# Patient Record
Sex: FEMALE | Race: WHITE | NOT HISPANIC OR LATINO | Employment: OTHER | URBAN - METROPOLITAN AREA
[De-identification: names, ages, dates, MRNs, and addresses within clinical notes are randomized per-mention and may not be internally consistent; named-entity substitution may affect disease eponyms.]

---

## 2019-07-22 ENCOUNTER — CLINICAL SUPPORT NO REQUIREMENTS (OUTPATIENT)
Dept: CARDIOLOGY | Facility: CLINIC | Age: 79
End: 2019-07-22

## 2019-07-22 DIAGNOSIS — R55 SYNCOPE AND COLLAPSE: ICD-10-CM

## 2019-07-22 DIAGNOSIS — Z95.0 PACEMAKER: Primary | ICD-10-CM

## 2019-07-22 PROCEDURE — 93294 REM INTERROG EVL PM/LDLS PM: CPT | Performed by: INTERNAL MEDICINE

## 2019-07-22 PROCEDURE — 93296 REM INTERROG EVL PM/IDS: CPT | Performed by: INTERNAL MEDICINE

## 2019-07-24 PROBLEM — Z95.0 PRESENCE OF CARDIAC PACEMAKER: Status: ACTIVE | Noted: 2017-02-21

## 2019-07-24 PROBLEM — I44.1 SECOND DEGREE ATRIOVENTRICULAR BLOCK: Status: ACTIVE | Noted: 2017-02-27

## 2019-08-16 PROBLEM — F03.90 DEMENTIA (HCC): Status: ACTIVE | Noted: 2017-02-27

## 2019-08-16 PROBLEM — R55 SYNCOPE: Status: ACTIVE | Noted: 2017-02-27

## 2019-10-21 ENCOUNTER — CLINICAL SUPPORT NO REQUIREMENTS (OUTPATIENT)
Dept: CARDIOLOGY | Facility: CLINIC | Age: 79
End: 2019-10-21

## 2019-10-21 DIAGNOSIS — Z95.0 PACEMAKER: ICD-10-CM

## 2019-10-21 DIAGNOSIS — R55 SYNCOPE AND COLLAPSE: Primary | ICD-10-CM

## 2019-10-21 PROCEDURE — 93294 REM INTERROG EVL PM/LDLS PM: CPT | Performed by: INTERNAL MEDICINE

## 2019-10-21 PROCEDURE — 93296 REM INTERROG EVL PM/IDS: CPT | Performed by: INTERNAL MEDICINE

## 2019-10-28 NOTE — PROGRESS NOTES
Spoke with dtr in law advsd that device check ok when f/u check would be and she verbally understood.

## 2020-01-17 ENCOUNTER — TELEPHONE (OUTPATIENT)
Dept: CARDIOLOGY | Facility: CLINIC | Age: 80
End: 2020-01-17

## 2020-01-17 NOTE — TELEPHONE ENCOUNTER
Called patient, left message on home number to call our office, needs follow up apt (with device check).

## 2020-01-20 ENCOUNTER — CLINICAL SUPPORT NO REQUIREMENTS (OUTPATIENT)
Dept: CARDIOLOGY | Facility: CLINIC | Age: 80
End: 2020-01-20

## 2020-01-20 DIAGNOSIS — Z95.0 PACEMAKER: Primary | ICD-10-CM

## 2020-01-20 DIAGNOSIS — R00.1 BRADYCARDIA: ICD-10-CM

## 2020-01-20 PROCEDURE — 93296 REM INTERROG EVL PM/IDS: CPT | Performed by: INTERNAL MEDICINE

## 2020-01-20 PROCEDURE — 93294 REM INTERROG EVL PM/LDLS PM: CPT | Performed by: INTERNAL MEDICINE

## 2020-01-23 NOTE — TELEPHONE ENCOUNTER
SPOKE TO ANUPAMA. PATIENT IS IN NURSING FACILITY, HAS DEMENTIA, AND NOT MOBILE. ANUPAMA SAID HE CANT JUST TAKE HER OUT AND THAT SHE HAS A BOX BESIDE HER BED THAT MONITORS PACEMAKER.

## 2020-01-24 NOTE — TELEPHONE ENCOUNTER
Spoke to Jean Claude, per Dr Peña, we will continue to remote monitor and not see in office due to current condition.

## 2020-04-21 ENCOUNTER — CLINICAL SUPPORT NO REQUIREMENTS (OUTPATIENT)
Dept: CARDIOLOGY | Facility: CLINIC | Age: 80
End: 2020-04-21

## 2020-04-21 DIAGNOSIS — Z95.0 PRESENCE OF CARDIAC PACEMAKER: Primary | ICD-10-CM

## 2020-04-21 DIAGNOSIS — I44.1 SECOND DEGREE ATRIOVENTRICULAR BLOCK: ICD-10-CM

## 2020-04-21 PROCEDURE — 93294 REM INTERROG EVL PM/LDLS PM: CPT | Performed by: INTERNAL MEDICINE

## 2020-04-21 PROCEDURE — 93296 REM INTERROG EVL PM/IDS: CPT | Performed by: INTERNAL MEDICINE

## 2020-06-26 ENCOUNTER — TELEPHONE (OUTPATIENT)
Dept: CARDIOLOGY | Facility: CLINIC | Age: 80
End: 2020-06-26

## 2020-06-26 NOTE — TELEPHONE ENCOUNTER
Jagjit called from [employer] to confirm Patient is still being monitored by our office he states that he does not believe that the patient knows Dr. Ji is retired. There is a previous phone note stating that the patient is in a nursing facility and has dementia. It was discussed with the son to continue monitoring patient.     Jagjit request call back at [phone]    thanks

## 2020-07-29 ENCOUNTER — CLINICAL SUPPORT NO REQUIREMENTS (OUTPATIENT)
Dept: CARDIOLOGY | Facility: CLINIC | Age: 80
End: 2020-07-29

## 2020-07-29 DIAGNOSIS — Z95.0 PACEMAKER: Primary | ICD-10-CM

## 2020-07-29 DIAGNOSIS — R00.1 BRADYCARDIA: ICD-10-CM

## 2020-07-29 PROCEDURE — 93296 REM INTERROG EVL PM/IDS: CPT | Performed by: INTERNAL MEDICINE

## 2020-07-29 PROCEDURE — 93294 REM INTERROG EVL PM/LDLS PM: CPT | Performed by: INTERNAL MEDICINE

## 2020-11-04 RX ORDER — RISPERIDONE 1 MG/1
TABLET ORAL
Qty: 30 TABLET | Refills: 3 | OUTPATIENT
Start: 2020-11-04

## 2020-11-29 RX ORDER — RISPERIDONE 1 MG/1
TABLET, ORALLY DISINTEGRATING ORAL
Qty: 15 TABLET | Refills: 4 | OUTPATIENT
Start: 2020-11-29

## 2020-12-28 RX ORDER — RISPERIDONE 1 MG/1
TABLET, ORALLY DISINTEGRATING ORAL
Qty: 15 TABLET | Refills: 4 | OUTPATIENT
Start: 2020-12-28

## 2021-01-12 RX ORDER — RISPERIDONE 1 MG/1
TABLET, ORALLY DISINTEGRATING ORAL
Qty: 15 TABLET | Refills: 4 | OUTPATIENT
Start: 2021-01-12

## 2021-04-06 PROCEDURE — 93296 REM INTERROG EVL PM/IDS: CPT | Performed by: INTERNAL MEDICINE

## 2021-04-06 PROCEDURE — 93294 REM INTERROG EVL PM/LDLS PM: CPT | Performed by: INTERNAL MEDICINE

## 2021-04-12 RX ORDER — RISPERIDONE 1 MG/1
TABLET, ORALLY DISINTEGRATING ORAL
Qty: 15 TABLET | OUTPATIENT
Start: 2021-04-12

## 2021-05-10 RX ORDER — RISPERIDONE 1 MG/1
TABLET, ORALLY DISINTEGRATING ORAL
Qty: 15 TABLET | OUTPATIENT
Start: 2021-05-10

## 2021-05-21 RX ORDER — RISPERIDONE 1 MG/1
TABLET, ORALLY DISINTEGRATING ORAL
Qty: 15 TABLET | OUTPATIENT
Start: 2021-05-21

## 2021-07-06 PROCEDURE — 93294 REM INTERROG EVL PM/LDLS PM: CPT | Performed by: INTERNAL MEDICINE

## 2021-07-06 PROCEDURE — 93296 REM INTERROG EVL PM/IDS: CPT | Performed by: INTERNAL MEDICINE

## 2022-01-04 PROCEDURE — 93296 REM INTERROG EVL PM/IDS: CPT | Performed by: INTERNAL MEDICINE

## 2022-01-04 PROCEDURE — 93294 REM INTERROG EVL PM/LDLS PM: CPT | Performed by: INTERNAL MEDICINE

## 2022-10-04 PROCEDURE — 93294 REM INTERROG EVL PM/LDLS PM: CPT | Performed by: INTERNAL MEDICINE

## 2022-10-04 PROCEDURE — 93296 REM INTERROG EVL PM/IDS: CPT | Performed by: INTERNAL MEDICINE

## 2023-01-03 PROCEDURE — 93296 REM INTERROG EVL PM/IDS: CPT | Performed by: INTERNAL MEDICINE

## 2023-01-03 PROCEDURE — 93294 REM INTERROG EVL PM/LDLS PM: CPT | Performed by: INTERNAL MEDICINE

## 2024-08-11 ENCOUNTER — APPOINTMENT (OUTPATIENT)
Dept: GENERAL RADIOLOGY | Facility: HOSPITAL | Age: 84
DRG: 482 | End: 2024-08-11
Payer: MEDICARE

## 2024-08-11 ENCOUNTER — HOSPITAL ENCOUNTER (INPATIENT)
Facility: HOSPITAL | Age: 84
LOS: 3 days | Discharge: NURSING, REHAB OR LONG-TERM CARE | DRG: 482 | End: 2024-08-15
Attending: EMERGENCY MEDICINE | Admitting: INTERNAL MEDICINE
Payer: MEDICARE
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
## 2024-08-11 DIAGNOSIS — S72.142A DISPLACED INTERTROCHANTERIC FRACTURE OF LEFT FEMUR, INITIAL ENCOUNTER FOR CLOSED FRACTURE: Primary | ICD-10-CM

## 2024-08-11 LAB
ANION GAP SERPL CALCULATED.3IONS-SCNC: 12.1 MMOL/L (ref 5–15)
APTT PPP: 27.4 SECONDS (ref 61–76.5)
BASOPHILS # BLD AUTO: 0.04 10*3/MM3 (ref 0–0.2)
BASOPHILS NFR BLD AUTO: 0.3 % (ref 0–1.5)
BUN SERPL-MCNC: 33 MG/DL (ref 8–23)
BUN/CREAT SERPL: 34.4 (ref 7–25)
CALCIUM SPEC-SCNC: 9.3 MG/DL (ref 8.6–10.5)
CHLORIDE SERPL-SCNC: 103 MMOL/L (ref 98–107)
CO2 SERPL-SCNC: 24.9 MMOL/L (ref 22–29)
CREAT SERPL-MCNC: 0.96 MG/DL (ref 0.57–1)
DEPRECATED RDW RBC AUTO: 47 FL (ref 37–54)
EGFRCR SERPLBLD CKD-EPI 2021: 57.7 ML/MIN/1.73
EOSINOPHIL # BLD AUTO: 0 10*3/MM3 (ref 0–0.4)
EOSINOPHIL NFR BLD AUTO: 0 % (ref 0.3–6.2)
ERYTHROCYTE [DISTWIDTH] IN BLOOD BY AUTOMATED COUNT: 14 % (ref 12.3–15.4)
GLUCOSE SERPL-MCNC: 128 MG/DL (ref 65–99)
HCT VFR BLD AUTO: 41 % (ref 34–46.6)
HGB BLD-MCNC: 13.6 G/DL (ref 12–15.9)
IMM GRANULOCYTES # BLD AUTO: 0.07 10*3/MM3 (ref 0–0.05)
IMM GRANULOCYTES NFR BLD AUTO: 0.5 % (ref 0–0.5)
INR PPP: 1.05 (ref 0.93–1.1)
LYMPHOCYTES # BLD AUTO: 1.27 10*3/MM3 (ref 0.7–3.1)
LYMPHOCYTES NFR BLD AUTO: 8.7 % (ref 19.6–45.3)
MCH RBC QN AUTO: 30.5 PG (ref 26.6–33)
MCHC RBC AUTO-ENTMCNC: 33.2 G/DL (ref 31.5–35.7)
MCV RBC AUTO: 91.9 FL (ref 79–97)
MONOCYTES # BLD AUTO: 1.25 10*3/MM3 (ref 0.1–0.9)
MONOCYTES NFR BLD AUTO: 8.6 % (ref 5–12)
NEUTROPHILS NFR BLD AUTO: 11.92 10*3/MM3 (ref 1.7–7)
NEUTROPHILS NFR BLD AUTO: 81.9 % (ref 42.7–76)
NRBC BLD AUTO-RTO: 0 /100 WBC (ref 0–0.2)
PLATELET # BLD AUTO: 327 10*3/MM3 (ref 140–450)
PMV BLD AUTO: 10.1 FL (ref 6–12)
POTASSIUM SERPL-SCNC: 4.3 MMOL/L (ref 3.5–5.2)
PROTHROMBIN TIME: 11.4 SECONDS (ref 9.6–11.7)
RBC # BLD AUTO: 4.46 10*6/MM3 (ref 3.77–5.28)
SODIUM SERPL-SCNC: 140 MMOL/L (ref 136–145)
WBC NRBC COR # BLD AUTO: 14.55 10*3/MM3 (ref 3.4–10.8)

## 2024-08-11 PROCEDURE — 80048 BASIC METABOLIC PNL TOTAL CA: CPT | Performed by: EMERGENCY MEDICINE

## 2024-08-11 PROCEDURE — 73502 X-RAY EXAM HIP UNI 2-3 VIEWS: CPT

## 2024-08-11 PROCEDURE — 25010000002 MORPHINE PER 10 MG: Performed by: EMERGENCY MEDICINE

## 2024-08-11 PROCEDURE — 85610 PROTHROMBIN TIME: CPT | Performed by: EMERGENCY MEDICINE

## 2024-08-11 PROCEDURE — 85730 THROMBOPLASTIN TIME PARTIAL: CPT | Performed by: EMERGENCY MEDICINE

## 2024-08-11 PROCEDURE — 99285 EMERGENCY DEPT VISIT HI MDM: CPT

## 2024-08-11 PROCEDURE — 25010000002 ONDANSETRON PER 1 MG: Performed by: EMERGENCY MEDICINE

## 2024-08-11 PROCEDURE — 85025 COMPLETE CBC W/AUTO DIFF WBC: CPT | Performed by: EMERGENCY MEDICINE

## 2024-08-11 PROCEDURE — 71045 X-RAY EXAM CHEST 1 VIEW: CPT

## 2024-08-11 RX ORDER — ACETAMINOPHEN 325 MG/1
650 TABLET ORAL EVERY 6 HOURS PRN
COMMUNITY

## 2024-08-11 RX ORDER — SENNOSIDES A AND B 8.6 MG/1
1 TABLET, FILM COATED ORAL EVERY 12 HOURS PRN
COMMUNITY

## 2024-08-11 RX ORDER — MORPHINE SULFATE 2 MG/ML
2 INJECTION, SOLUTION INTRAMUSCULAR; INTRAVENOUS ONCE
Status: COMPLETED | OUTPATIENT
Start: 2024-08-11 | End: 2024-08-11

## 2024-08-11 RX ORDER — MORPHINE SULFATE 20 MG/ML
10 SOLUTION ORAL EVERY 4 HOURS PRN
COMMUNITY

## 2024-08-11 RX ORDER — ONDANSETRON 2 MG/ML
4 INJECTION INTRAMUSCULAR; INTRAVENOUS ONCE
Status: COMPLETED | OUTPATIENT
Start: 2024-08-11 | End: 2024-08-11

## 2024-08-11 RX ORDER — HYOSCYAMINE SULFATE 0.125 MG
0.12 TABLET ORAL EVERY 4 HOURS PRN
COMMUNITY

## 2024-08-11 RX ORDER — POLYETHYLENE GLYCOL 3350 17 G/17G
17 POWDER, FOR SOLUTION ORAL DAILY PRN
COMMUNITY

## 2024-08-11 RX ORDER — BISACODYL 10 MG
10 SUPPOSITORY, RECTAL RECTAL SEE ADMIN INSTRUCTIONS
COMMUNITY

## 2024-08-11 RX ORDER — ROPINIROLE 0.25 MG/1
0.25 TABLET, FILM COATED ORAL NIGHTLY
COMMUNITY

## 2024-08-11 RX ORDER — ESCITALOPRAM OXALATE 10 MG/1
10 TABLET ORAL DAILY
COMMUNITY

## 2024-08-11 RX ADMIN — ONDANSETRON 4 MG: 2 INJECTION INTRAMUSCULAR; INTRAVENOUS at 19:10

## 2024-08-11 RX ADMIN — MORPHINE SULFATE 2 MG: 2 INJECTION, SOLUTION INTRAMUSCULAR; INTRAVENOUS at 19:11

## 2024-08-11 NOTE — Clinical Note
Level of Care: Med/Surg [1]   Admitting Physician: JOHN MCFADDEN [[id number]]   Attending Physician: JOHN MCFADDEN [[id number]]

## 2024-08-11 NOTE — LETTER
EMS Transport Request  For use at [facility], [place], [place], [facility], and [place] only   Patient Name: Viri Hatfield : 1940   Weight:56.3 kg (124 lb 1.9 oz) Pick-up Location: [facility] BLS/ALS: BLS/ALS: BLS   Insurance: MEDICARE Auth End Date: 08/15/2024   Pre-Cert #: D/C Summary complete:    Destination: [address] [place] [address]   Contact Precautions: None   Equipment (O2, Fluids, etc.): O2, settings 2L NC   Arrive By Date/Time: 08/15/2024  1145 Stretcher/WC: Stretcher   CM Requesting: Nilsa Agee RN Ext: [phone]   Notes/Medical Necessity: UNABLE TO AMBULATE, CONFUSED, POOR TRUNK CONTROL 02 2L      ______________________________________________________________________    *Only 2 patient bags OR 1 carry-on size bag are permitted.  Wheelchairs and walkers CANNOT transported with the patient. Acknowledge: Yes

## 2024-08-11 NOTE — ED PROVIDER NOTES
"Subjective   History of Present Illness  Chief complaint: Left hip injury    84-year-old female presents with a left hip injury after a fall.  EMS reports patient slipped out of her wheelchair last night and has been unable to bear weight since that time.  The nursing home had an x-ray performed today which showed a left hip fracture.  Patient has dementia and cannot provide any additional details.  She does not take any blood thinners.    History provided by:  EMS personnel      Review of Systems   Unable to perform ROS: Dementia       Past Medical History:   Diagnosis Date    Dementia     Pacemaker 02/21/2017    AV Eduard Gleason - Dr. Zuñiga     Syncope     x3 Jan. and Feb. 2017       No Known Allergies    Past Surgical History:   Procedure Laterality Date    BREAST LUMPECTOMY Left     HERNIA REPAIR         Family History   Problem Relation Age of Onset    Cancer Mother        Social History     Socioeconomic History    Marital status:    Tobacco Use    Smoking status: Never       /75   Pulse 84   Temp 98.5 °F (36.9 °C) (Oral)   Resp 14   Ht 157.5 cm (62\")   Wt 54.4 kg (119 lb 14.9 oz)   SpO2 96%   BMI 21.94 kg/m²       Objective   Physical Exam  Vitals and nursing note reviewed.   Constitutional:       Appearance: Normal appearance.   HENT:      Head: Normocephalic and atraumatic.      Mouth/Throat:      Mouth: Mucous membranes are moist.   Cardiovascular:      Rate and Rhythm: Normal rate and regular rhythm.      Heart sounds: Normal heart sounds.   Pulmonary:      Effort: Pulmonary effort is normal. No respiratory distress.      Breath sounds: Normal breath sounds.   Abdominal:      Palpations: Abdomen is soft.      Tenderness: There is no abdominal tenderness.   Musculoskeletal:      Comments: Obvious deformity to the left hip with shortening of the left leg.  Neurovascular intact distally.   Skin:     General: Skin is warm and dry.   Neurological:      Mental Status: She is alert. "      Comments: Oriented to person only.  This is baseline according to EMS.  No obvious focal motor or sensory deficit appreciated.         Procedures           ED Course      Results for orders placed or performed during the hospital encounter of 08/11/24   Basic Metabolic Panel    Specimen: Blood   Result Value Ref Range    Glucose 128 (H) 65 - 99 mg/dL    BUN 33 (H) 8 - 23 mg/dL    Creatinine 0.96 0.57 - 1.00 mg/dL    Sodium 140 136 - 145 mmol/L    Potassium 4.3 3.5 - 5.2 mmol/L    Chloride 103 98 - 107 mmol/L    CO2 24.9 22.0 - 29.0 mmol/L    Calcium 9.3 8.6 - 10.5 mg/dL    BUN/Creatinine Ratio 34.4 (H) 7.0 - 25.0    Anion Gap 12.1 5.0 - 15.0 mmol/L    eGFR 57.7 (L) >60.0 mL/min/1.73   Protime-INR    Specimen: Blood   Result Value Ref Range    Protime 11.4 9.6 - 11.7 Seconds    INR 1.05 0.93 - 1.10   aPTT    Specimen: Blood   Result Value Ref Range    PTT 27.4 (L) 61.0 - 76.5 seconds   CBC Auto Differential    Specimen: Blood   Result Value Ref Range    WBC 14.55 (H) 3.40 - 10.80 10*3/mm3    RBC 4.46 3.77 - 5.28 10*6/mm3    Hemoglobin 13.6 12.0 - 15.9 g/dL    Hematocrit 41.0 34.0 - 46.6 %    MCV 91.9 79.0 - 97.0 fL    MCH 30.5 26.6 - 33.0 pg    MCHC 33.2 31.5 - 35.7 g/dL    RDW 14.0 12.3 - 15.4 %    RDW-SD 47.0 37.0 - 54.0 fl    MPV 10.1 6.0 - 12.0 fL    Platelets 327 140 - 450 10*3/mm3    Neutrophil % 81.9 (H) 42.7 - 76.0 %    Lymphocyte % 8.7 (L) 19.6 - 45.3 %    Monocyte % 8.6 5.0 - 12.0 %    Eosinophil % 0.0 (L) 0.3 - 6.2 %    Basophil % 0.3 0.0 - 1.5 %    Immature Grans % 0.5 0.0 - 0.5 %    Neutrophils, Absolute 11.92 (H) 1.70 - 7.00 10*3/mm3    Lymphocytes, Absolute 1.27 0.70 - 3.10 10*3/mm3    Monocytes, Absolute 1.25 (H) 0.10 - 0.90 10*3/mm3    Eosinophils, Absolute 0.00 0.00 - 0.40 10*3/mm3    Basophils, Absolute 0.04 0.00 - 0.20 10*3/mm3    Immature Grans, Absolute 0.07 (H) 0.00 - 0.05 10*3/mm3    nRBC 0.0 0.0 - 0.2 /100 WBC     XR Chest 1 View    Result Date: 8/11/2024  Impression: No definite acute  abnormality. If symptoms persist, please consider follow-up with dedicated CT of the chest. Electronically Signed: Faustino Payton,   8/11/2024 6:45 PM EDT  Workstation ID: [id number]    XR Hip With or Without Pelvis 2 - 3 View Left    Result Date: 8/11/2024  Impression: Angulated, impacted, mildly displaced and possibly comminuted fracture through the left intertrochanteric region. Electronically Signed: Faustino Payton,   8/11/2024 6:44 PM EDT  Workstation ID: [id number]                                          Medical Decision Making  Amount and/or Complexity of Data Reviewed  Labs: ordered.  Radiology: ordered.    Risk  Prescription drug management.      Patient had the above valuation.  Results were discussed with the patient.  My interpretation of chest x-ray shows no infiltrate or effusion.  Left hip x-ray is showing a left intertrochanteric femur fracture.  White blood cell count is elevated at 14.55.  No evidence of infection.  BMP is unremarkable.  I discussed with the on-call primary doctor and patient will be admitted for further evaluation and management.  I also discussed with the on-call orthopedist who will consult on the patient.      Final diagnoses:   Displaced intertrochanteric fracture of left femur, initial encounter for closed fracture   Fall, initial encounter       ED Disposition  ED Disposition       ED Disposition   Decision to Admit    Condition   --    Comment   Level of Care: Med/Surg [1]   Admitting Physician: JOHN MCFADDEN [[id number]]   Attending Physician: JOHN MCFADDEN [[id number]]                 No follow-up provider specified.       Medication List      No changes were made to your prescriptions during this visit.            Jose Loco MD  08/11/24 1952

## 2024-08-12 ENCOUNTER — APPOINTMENT (OUTPATIENT)
Dept: GENERAL RADIOLOGY | Facility: HOSPITAL | Age: 84
DRG: 482 | End: 2024-08-12
Payer: MEDICARE

## 2024-08-12 ENCOUNTER — ANESTHESIA EVENT (OUTPATIENT)
Dept: PERIOP | Facility: HOSPITAL | Age: 84
End: 2024-08-12
Payer: MEDICARE

## 2024-08-12 ENCOUNTER — ANESTHESIA (OUTPATIENT)
Dept: PERIOP | Facility: HOSPITAL | Age: 84
End: 2024-08-12
Payer: MEDICARE

## 2024-08-12 PROBLEM — S72.142A CLOSED DISPLACED INTERTROCHANTERIC FRACTURE OF LEFT FEMUR: Status: ACTIVE | Noted: 2024-08-12

## 2024-08-12 PROBLEM — S72.142A DISPLACED INTERTROCHANTERIC FRACTURE OF LEFT FEMUR, INITIAL ENCOUNTER FOR CLOSED FRACTURE: Status: ACTIVE | Noted: 2024-08-11

## 2024-08-12 LAB
ABO GROUP BLD: NORMAL
ANION GAP SERPL CALCULATED.3IONS-SCNC: 7.7 MMOL/L (ref 5–15)
ANION GAP SERPL CALCULATED.3IONS-SCNC: 9 MMOL/L (ref 5–15)
BASOPHILS # BLD AUTO: 0.02 10*3/MM3 (ref 0–0.2)
BASOPHILS # BLD AUTO: 0.05 10*3/MM3 (ref 0–0.2)
BASOPHILS NFR BLD AUTO: 0.1 % (ref 0–1.5)
BASOPHILS NFR BLD AUTO: 0.4 % (ref 0–1.5)
BLD GP AB SCN SERPL QL: NEGATIVE
BUN SERPL-MCNC: 34 MG/DL (ref 8–23)
BUN SERPL-MCNC: 39 MG/DL (ref 8–23)
BUN/CREAT SERPL: 33.7 (ref 7–25)
BUN/CREAT SERPL: 34.2 (ref 7–25)
CALCIUM SPEC-SCNC: 8.2 MG/DL (ref 8.6–10.5)
CALCIUM SPEC-SCNC: 9.3 MG/DL (ref 8.6–10.5)
CHLORIDE SERPL-SCNC: 108 MMOL/L (ref 98–107)
CHLORIDE SERPL-SCNC: 108 MMOL/L (ref 98–107)
CO2 SERPL-SCNC: 25 MMOL/L (ref 22–29)
CO2 SERPL-SCNC: 27.3 MMOL/L (ref 22–29)
CREAT SERPL-MCNC: 1.01 MG/DL (ref 0.57–1)
CREAT SERPL-MCNC: 1.14 MG/DL (ref 0.57–1)
DEPRECATED RDW RBC AUTO: 48.3 FL (ref 37–54)
DEPRECATED RDW RBC AUTO: 48.5 FL (ref 37–54)
EGFRCR SERPLBLD CKD-EPI 2021: 47 ML/MIN/1.73
EGFRCR SERPLBLD CKD-EPI 2021: 54.3 ML/MIN/1.73
EOSINOPHIL # BLD AUTO: 0.01 10*3/MM3 (ref 0–0.4)
EOSINOPHIL # BLD AUTO: 0.03 10*3/MM3 (ref 0–0.4)
EOSINOPHIL NFR BLD AUTO: 0.1 % (ref 0.3–6.2)
EOSINOPHIL NFR BLD AUTO: 0.2 % (ref 0.3–6.2)
ERYTHROCYTE [DISTWIDTH] IN BLOOD BY AUTOMATED COUNT: 14 % (ref 12.3–15.4)
ERYTHROCYTE [DISTWIDTH] IN BLOOD BY AUTOMATED COUNT: 14.3 % (ref 12.3–15.4)
GLUCOSE SERPL-MCNC: 110 MG/DL (ref 65–99)
GLUCOSE SERPL-MCNC: 207 MG/DL (ref 65–99)
HCT VFR BLD AUTO: 36.7 % (ref 34–46.6)
HCT VFR BLD AUTO: 38.1 % (ref 34–46.6)
HGB BLD-MCNC: 12 G/DL (ref 12–15.9)
HGB BLD-MCNC: 12.2 G/DL (ref 12–15.9)
IMM GRANULOCYTES # BLD AUTO: 0.06 10*3/MM3 (ref 0–0.05)
IMM GRANULOCYTES # BLD AUTO: 0.1 10*3/MM3 (ref 0–0.05)
IMM GRANULOCYTES NFR BLD AUTO: 0.5 % (ref 0–0.5)
IMM GRANULOCYTES NFR BLD AUTO: 0.7 % (ref 0–0.5)
LYMPHOCYTES # BLD AUTO: 0.64 10*3/MM3 (ref 0.7–3.1)
LYMPHOCYTES # BLD AUTO: 1.13 10*3/MM3 (ref 0.7–3.1)
LYMPHOCYTES NFR BLD AUTO: 4.4 % (ref 19.6–45.3)
LYMPHOCYTES NFR BLD AUTO: 8.6 % (ref 19.6–45.3)
MCH RBC QN AUTO: 30 PG (ref 26.6–33)
MCH RBC QN AUTO: 30.7 PG (ref 26.6–33)
MCHC RBC AUTO-ENTMCNC: 32 G/DL (ref 31.5–35.7)
MCHC RBC AUTO-ENTMCNC: 32.7 G/DL (ref 31.5–35.7)
MCV RBC AUTO: 93.8 FL (ref 79–97)
MCV RBC AUTO: 93.9 FL (ref 79–97)
MONOCYTES # BLD AUTO: 1.18 10*3/MM3 (ref 0.1–0.9)
MONOCYTES # BLD AUTO: 1.31 10*3/MM3 (ref 0.1–0.9)
MONOCYTES NFR BLD AUTO: 8.9 % (ref 5–12)
MONOCYTES NFR BLD AUTO: 8.9 % (ref 5–12)
NEUTROPHILS NFR BLD AUTO: 10.75 10*3/MM3 (ref 1.7–7)
NEUTROPHILS NFR BLD AUTO: 12.6 10*3/MM3 (ref 1.7–7)
NEUTROPHILS NFR BLD AUTO: 81.4 % (ref 42.7–76)
NEUTROPHILS NFR BLD AUTO: 85.8 % (ref 42.7–76)
NRBC BLD AUTO-RTO: 0 /100 WBC (ref 0–0.2)
NRBC BLD AUTO-RTO: 0 /100 WBC (ref 0–0.2)
PLATELET # BLD AUTO: 268 10*3/MM3 (ref 140–450)
PLATELET # BLD AUTO: 286 10*3/MM3 (ref 140–450)
PMV BLD AUTO: 10.1 FL (ref 6–12)
PMV BLD AUTO: 9.8 FL (ref 6–12)
POTASSIUM SERPL-SCNC: 4.6 MMOL/L (ref 3.5–5.2)
POTASSIUM SERPL-SCNC: 4.7 MMOL/L (ref 3.5–5.2)
RBC # BLD AUTO: 3.91 10*6/MM3 (ref 3.77–5.28)
RBC # BLD AUTO: 4.06 10*6/MM3 (ref 3.77–5.28)
RH BLD: POSITIVE
SODIUM SERPL-SCNC: 142 MMOL/L (ref 136–145)
SODIUM SERPL-SCNC: 143 MMOL/L (ref 136–145)
T&S EXPIRATION DATE: NORMAL
WBC NRBC COR # BLD AUTO: 13.2 10*3/MM3 (ref 3.4–10.8)
WBC NRBC COR # BLD AUTO: 14.68 10*3/MM3 (ref 3.4–10.8)

## 2024-08-12 PROCEDURE — 25010000002 SUGAMMADEX 200 MG/2ML SOLUTION: Performed by: NURSE ANESTHETIST, CERTIFIED REGISTERED

## 2024-08-12 PROCEDURE — 36415 COLL VENOUS BLD VENIPUNCTURE: CPT

## 2024-08-12 PROCEDURE — 80048 BASIC METABOLIC PNL TOTAL CA: CPT

## 2024-08-12 PROCEDURE — 86900 BLOOD TYPING SEROLOGIC ABO: CPT | Performed by: ORTHOPAEDIC SURGERY

## 2024-08-12 PROCEDURE — 86901 BLOOD TYPING SEROLOGIC RH(D): CPT

## 2024-08-12 PROCEDURE — C1713 ANCHOR/SCREW BN/BN,TIS/BN: HCPCS | Performed by: ORTHOPAEDIC SURGERY

## 2024-08-12 PROCEDURE — 86901 BLOOD TYPING SEROLOGIC RH(D): CPT | Performed by: ORTHOPAEDIC SURGERY

## 2024-08-12 PROCEDURE — 25010000002 BUPIVACAINE (PF) 0.5 % SOLUTION: Performed by: ORTHOPAEDIC SURGERY

## 2024-08-12 PROCEDURE — 25010000002 PHENYLEPHRINE 10 MG/ML SOLUTION 5 ML VIAL: Performed by: NURSE ANESTHETIST, CERTIFIED REGISTERED

## 2024-08-12 PROCEDURE — 25010000002 FENTANYL CITRATE (PF) 50 MCG/ML SOLUTION: Performed by: NURSE ANESTHETIST, CERTIFIED REGISTERED

## 2024-08-12 PROCEDURE — 80048 BASIC METABOLIC PNL TOTAL CA: CPT | Performed by: ORTHOPAEDIC SURGERY

## 2024-08-12 PROCEDURE — C1769 GUIDE WIRE: HCPCS | Performed by: ORTHOPAEDIC SURGERY

## 2024-08-12 PROCEDURE — 0QS736Z REPOSITION LEFT UPPER FEMUR WITH INTRAMEDULLARY INTERNAL FIXATION DEVICE, PERCUTANEOUS APPROACH: ICD-10-PCS | Performed by: ORTHOPAEDIC SURGERY

## 2024-08-12 PROCEDURE — 86900 BLOOD TYPING SEROLOGIC ABO: CPT

## 2024-08-12 PROCEDURE — 25010000002 PROPOFOL 200 MG/20ML EMULSION: Performed by: NURSE ANESTHETIST, CERTIFIED REGISTERED

## 2024-08-12 PROCEDURE — 85025 COMPLETE CBC W/AUTO DIFF WBC: CPT | Performed by: ORTHOPAEDIC SURGERY

## 2024-08-12 PROCEDURE — 76000 FLUOROSCOPY <1 HR PHYS/QHP: CPT

## 2024-08-12 PROCEDURE — 25810000003 SODIUM CHLORIDE 0.9 % SOLUTION 250 ML FLEX CONT: Performed by: NURSE ANESTHETIST, CERTIFIED REGISTERED

## 2024-08-12 PROCEDURE — 86850 RBC ANTIBODY SCREEN: CPT | Performed by: ORTHOPAEDIC SURGERY

## 2024-08-12 PROCEDURE — 25010000002 DEXAMETHASONE PER 1 MG: Performed by: NURSE ANESTHETIST, CERTIFIED REGISTERED

## 2024-08-12 PROCEDURE — 25010000002 ONDANSETRON PER 1 MG: Performed by: NURSE ANESTHETIST, CERTIFIED REGISTERED

## 2024-08-12 PROCEDURE — 25010000002 CEFAZOLIN PER 500 MG: Performed by: ORTHOPAEDIC SURGERY

## 2024-08-12 PROCEDURE — 25010000002 FENTANYL CITRATE (PF) 100 MCG/2ML SOLUTION: Performed by: NURSE ANESTHETIST, CERTIFIED REGISTERED

## 2024-08-12 PROCEDURE — 25810000003 LACTATED RINGERS PER 1000 ML: Performed by: NURSE ANESTHETIST, CERTIFIED REGISTERED

## 2024-08-12 PROCEDURE — 73502 X-RAY EXAM HIP UNI 2-3 VIEWS: CPT

## 2024-08-12 PROCEDURE — 25810000003 LACTATED RINGERS PER 1000 ML: Performed by: ORTHOPAEDIC SURGERY

## 2024-08-12 PROCEDURE — 85025 COMPLETE CBC W/AUTO DIFF WBC: CPT

## 2024-08-12 DEVICE — ZNN CMN LAG SCREW 10.5X95
Type: IMPLANTABLE DEVICE | Site: HIP | Status: FUNCTIONAL
Brand: ZIMMER® NATURAL NAIL® SYSTEM

## 2024-08-12 DEVICE — ZNN CMN NAIL 10MMX21.5CM 125L
Type: IMPLANTABLE DEVICE | Site: HIP | Status: FUNCTIONAL
Brand: ZIMMER® NATURAL NAIL® SYSTEM

## 2024-08-12 DEVICE — SCRW CORT FA FUL/THRD HEX3.5 TI 5X30MM: Type: IMPLANTABLE DEVICE | Site: HIP | Status: FUNCTIONAL

## 2024-08-12 RX ORDER — FENTANYL CITRATE 50 UG/ML
25 INJECTION, SOLUTION INTRAMUSCULAR; INTRAVENOUS
Status: DISCONTINUED | OUTPATIENT
Start: 2024-08-12 | End: 2024-08-12 | Stop reason: HOSPADM

## 2024-08-12 RX ORDER — FENTANYL CITRATE 50 UG/ML
INJECTION, SOLUTION INTRAMUSCULAR; INTRAVENOUS AS NEEDED
Status: DISCONTINUED | OUTPATIENT
Start: 2024-08-12 | End: 2024-08-12 | Stop reason: SURG

## 2024-08-12 RX ORDER — DIPHENHYDRAMINE HYDROCHLORIDE 50 MG/ML
12.5 INJECTION INTRAMUSCULAR; INTRAVENOUS
Status: DISCONTINUED | OUTPATIENT
Start: 2024-08-12 | End: 2024-08-12 | Stop reason: HOSPADM

## 2024-08-12 RX ORDER — AMOXICILLIN 250 MG
2 CAPSULE ORAL 2 TIMES DAILY PRN
Status: DISCONTINUED | OUTPATIENT
Start: 2024-08-12 | End: 2024-08-15 | Stop reason: HOSPADM

## 2024-08-12 RX ORDER — ACETAMINOPHEN 325 MG/1
650 TABLET ORAL ONCE AS NEEDED
Status: DISCONTINUED | OUTPATIENT
Start: 2024-08-12 | End: 2024-08-12 | Stop reason: HOSPADM

## 2024-08-12 RX ORDER — ROPINIROLE 0.25 MG/1
0.25 TABLET, FILM COATED ORAL NIGHTLY
Status: DISCONTINUED | OUTPATIENT
Start: 2024-08-12 | End: 2024-08-15 | Stop reason: HOSPADM

## 2024-08-12 RX ORDER — ONDANSETRON 2 MG/ML
INJECTION INTRAMUSCULAR; INTRAVENOUS AS NEEDED
Status: DISCONTINUED | OUTPATIENT
Start: 2024-08-12 | End: 2024-08-12 | Stop reason: SURG

## 2024-08-12 RX ORDER — ALBUTEROL SULFATE 2.5 MG/3ML
2.5 SOLUTION RESPIRATORY (INHALATION) ONCE AS NEEDED
Status: DISCONTINUED | OUTPATIENT
Start: 2024-08-12 | End: 2024-08-12 | Stop reason: HOSPADM

## 2024-08-12 RX ORDER — BISACODYL 10 MG
10 SUPPOSITORY, RECTAL RECTAL SEE ADMIN INSTRUCTIONS
Status: DISCONTINUED | OUTPATIENT
Start: 2024-08-12 | End: 2024-08-15 | Stop reason: HOSPADM

## 2024-08-12 RX ORDER — POLYETHYLENE GLYCOL 3350 17 G/17G
17 POWDER, FOR SOLUTION ORAL DAILY
Status: DISCONTINUED | OUTPATIENT
Start: 2024-08-13 | End: 2024-08-15 | Stop reason: HOSPADM

## 2024-08-12 RX ORDER — NALOXONE HCL 0.4 MG/ML
0.4 VIAL (ML) INJECTION AS NEEDED
Status: DISCONTINUED | OUTPATIENT
Start: 2024-08-12 | End: 2024-08-12 | Stop reason: HOSPADM

## 2024-08-12 RX ORDER — SODIUM CHLORIDE, SODIUM LACTATE, POTASSIUM CHLORIDE, CALCIUM CHLORIDE 600; 310; 30; 20 MG/100ML; MG/100ML; MG/100ML; MG/100ML
20 INJECTION, SOLUTION INTRAVENOUS ONCE
Status: COMPLETED | OUTPATIENT
Start: 2024-08-12 | End: 2024-08-12

## 2024-08-12 RX ORDER — SENNOSIDES A AND B 8.6 MG/1
1 TABLET, FILM COATED ORAL EVERY 12 HOURS PRN
Status: DISCONTINUED | OUTPATIENT
Start: 2024-08-12 | End: 2024-08-12 | Stop reason: SDUPTHER

## 2024-08-12 RX ORDER — PROPOFOL 10 MG/ML
INJECTION, EMULSION INTRAVENOUS AS NEEDED
Status: DISCONTINUED | OUTPATIENT
Start: 2024-08-12 | End: 2024-08-12 | Stop reason: SURG

## 2024-08-12 RX ORDER — ASPIRIN 81 MG/1
81 TABLET, CHEWABLE ORAL 2 TIMES DAILY
Status: DISCONTINUED | OUTPATIENT
Start: 2024-08-13 | End: 2024-08-15 | Stop reason: HOSPADM

## 2024-08-12 RX ORDER — HYDROCODONE BITARTRATE AND ACETAMINOPHEN 5; 325 MG/1; MG/1
1 TABLET ORAL EVERY 8 HOURS PRN
Status: DISCONTINUED | OUTPATIENT
Start: 2024-08-12 | End: 2024-08-15 | Stop reason: HOSPADM

## 2024-08-12 RX ORDER — SODIUM CHLORIDE 0.9 % (FLUSH) 0.9 %
10 SYRINGE (ML) INJECTION AS NEEDED
Status: DISCONTINUED | OUTPATIENT
Start: 2024-08-12 | End: 2024-08-15 | Stop reason: HOSPADM

## 2024-08-12 RX ORDER — ONDANSETRON 2 MG/ML
4 INJECTION INTRAMUSCULAR; INTRAVENOUS EVERY 6 HOURS PRN
Status: DISCONTINUED | OUTPATIENT
Start: 2024-08-12 | End: 2024-08-15 | Stop reason: HOSPADM

## 2024-08-12 RX ORDER — ROCURONIUM BROMIDE 10 MG/ML
INJECTION, SOLUTION INTRAVENOUS AS NEEDED
Status: DISCONTINUED | OUTPATIENT
Start: 2024-08-12 | End: 2024-08-12 | Stop reason: SURG

## 2024-08-12 RX ORDER — DEXAMETHASONE SODIUM PHOSPHATE 4 MG/ML
INJECTION, SOLUTION INTRA-ARTICULAR; INTRALESIONAL; INTRAMUSCULAR; INTRAVENOUS; SOFT TISSUE AS NEEDED
Status: DISCONTINUED | OUTPATIENT
Start: 2024-08-12 | End: 2024-08-12 | Stop reason: SURG

## 2024-08-12 RX ORDER — SODIUM CHLORIDE 0.9 % (FLUSH) 0.9 %
10 SYRINGE (ML) INJECTION EVERY 12 HOURS SCHEDULED
Status: DISCONTINUED | OUTPATIENT
Start: 2024-08-12 | End: 2024-08-15 | Stop reason: HOSPADM

## 2024-08-12 RX ORDER — SODIUM CHLORIDE, SODIUM LACTATE, POTASSIUM CHLORIDE, CALCIUM CHLORIDE 600; 310; 30; 20 MG/100ML; MG/100ML; MG/100ML; MG/100ML
INJECTION, SOLUTION INTRAVENOUS CONTINUOUS PRN
Status: DISCONTINUED | OUTPATIENT
Start: 2024-08-12 | End: 2024-08-12 | Stop reason: SURG

## 2024-08-12 RX ORDER — ACETAMINOPHEN 650 MG/1
650 SUPPOSITORY RECTAL EVERY 4 HOURS PRN
Status: DISCONTINUED | OUTPATIENT
Start: 2024-08-12 | End: 2024-08-12 | Stop reason: HOSPADM

## 2024-08-12 RX ORDER — FAMOTIDINE 20 MG/1
20 TABLET, FILM COATED ORAL DAILY
Status: DISCONTINUED | OUTPATIENT
Start: 2024-08-12 | End: 2024-08-15 | Stop reason: HOSPADM

## 2024-08-12 RX ORDER — ONDANSETRON 2 MG/ML
4 INJECTION INTRAMUSCULAR; INTRAVENOUS ONCE AS NEEDED
Status: DISCONTINUED | OUTPATIENT
Start: 2024-08-12 | End: 2024-08-12 | Stop reason: HOSPADM

## 2024-08-12 RX ORDER — HYDRALAZINE HYDROCHLORIDE 20 MG/ML
5 INJECTION INTRAMUSCULAR; INTRAVENOUS
Status: DISCONTINUED | OUTPATIENT
Start: 2024-08-12 | End: 2024-08-12 | Stop reason: HOSPADM

## 2024-08-12 RX ORDER — BISACODYL 5 MG/1
5 TABLET, DELAYED RELEASE ORAL DAILY PRN
Status: DISCONTINUED | OUTPATIENT
Start: 2024-08-12 | End: 2024-08-15 | Stop reason: HOSPADM

## 2024-08-12 RX ORDER — BISACODYL 10 MG
10 SUPPOSITORY, RECTAL RECTAL DAILY PRN
Status: DISCONTINUED | OUTPATIENT
Start: 2024-08-12 | End: 2024-08-15 | Stop reason: HOSPADM

## 2024-08-12 RX ORDER — ESCITALOPRAM OXALATE 10 MG/1
10 TABLET ORAL DAILY
Status: DISCONTINUED | OUTPATIENT
Start: 2024-08-12 | End: 2024-08-15 | Stop reason: HOSPADM

## 2024-08-12 RX ORDER — SODIUM CHLORIDE 9 MG/ML
40 INJECTION, SOLUTION INTRAVENOUS AS NEEDED
Status: DISCONTINUED | OUTPATIENT
Start: 2024-08-12 | End: 2024-08-15 | Stop reason: HOSPADM

## 2024-08-12 RX ORDER — ACETAMINOPHEN 325 MG/1
650 TABLET ORAL EVERY 6 HOURS PRN
Status: DISCONTINUED | OUTPATIENT
Start: 2024-08-12 | End: 2024-08-15 | Stop reason: HOSPADM

## 2024-08-12 RX ORDER — LABETALOL HYDROCHLORIDE 5 MG/ML
5 INJECTION, SOLUTION INTRAVENOUS
Status: DISCONTINUED | OUTPATIENT
Start: 2024-08-12 | End: 2024-08-12 | Stop reason: HOSPADM

## 2024-08-12 RX ORDER — POLYETHYLENE GLYCOL 3350 17 G/17G
17 POWDER, FOR SOLUTION ORAL DAILY PRN
Status: DISCONTINUED | OUTPATIENT
Start: 2024-08-12 | End: 2024-08-15 | Stop reason: HOSPADM

## 2024-08-12 RX ORDER — BUPIVACAINE HYDROCHLORIDE 5 MG/ML
INJECTION, SOLUTION EPIDURAL; INTRACAUDAL AS NEEDED
Status: DISCONTINUED | OUTPATIENT
Start: 2024-08-12 | End: 2024-08-12 | Stop reason: HOSPADM

## 2024-08-12 RX ADMIN — LIDOCAINE HYDROCHLORIDE 50 MG: 20 INJECTION, SOLUTION EPIDURAL; INFILTRATION; INTRACAUDAL; PERINEURAL at 15:11

## 2024-08-12 RX ADMIN — Medication 10 ML: at 21:40

## 2024-08-12 RX ADMIN — PHENYLEPHRINE HYDROCHLORIDE 0.5 MCG/KG/MIN: 10 INJECTION INTRAVENOUS at 15:13

## 2024-08-12 RX ADMIN — FENTANYL CITRATE 25 MCG: 50 INJECTION, SOLUTION INTRAMUSCULAR; INTRAVENOUS at 15:45

## 2024-08-12 RX ADMIN — SODIUM CHLORIDE, SODIUM LACTATE, POTASSIUM CHLORIDE, AND CALCIUM CHLORIDE: .6; .31; .03; .02 INJECTION, SOLUTION INTRAVENOUS at 15:09

## 2024-08-12 RX ADMIN — SODIUM CHLORIDE, POTASSIUM CHLORIDE, SODIUM LACTATE AND CALCIUM CHLORIDE 20 ML/HR: 600; 310; 30; 20 INJECTION, SOLUTION INTRAVENOUS at 14:40

## 2024-08-12 RX ADMIN — ONDANSETRON 4 MG: 2 INJECTION INTRAMUSCULAR; INTRAVENOUS at 15:50

## 2024-08-12 RX ADMIN — SODIUM CHLORIDE 2000 MG: 900 INJECTION INTRAVENOUS at 15:06

## 2024-08-12 RX ADMIN — ESCITALOPRAM OXALATE 10 MG: 10 TABLET ORAL at 21:38

## 2024-08-12 RX ADMIN — PROPOFOL 20 MG: 10 INJECTION, EMULSION INTRAVENOUS at 15:57

## 2024-08-12 RX ADMIN — FENTANYL CITRATE 25 MCG: 50 INJECTION, SOLUTION INTRAMUSCULAR; INTRAVENOUS at 15:25

## 2024-08-12 RX ADMIN — ROCURONIUM BROMIDE 50 MG: 10 INJECTION, SOLUTION INTRAVENOUS at 15:11

## 2024-08-12 RX ADMIN — DEXAMETHASONE SODIUM PHOSPHATE 4 MG: 4 INJECTION, SOLUTION INTRAMUSCULAR; INTRAVENOUS at 15:26

## 2024-08-12 RX ADMIN — Medication 10 ML: at 11:20

## 2024-08-12 RX ADMIN — FENTANYL CITRATE 25 MCG: 50 INJECTION, SOLUTION INTRAMUSCULAR; INTRAVENOUS at 17:16

## 2024-08-12 RX ADMIN — SUGAMMADEX 200 MG: 100 INJECTION, SOLUTION INTRAVENOUS at 15:50

## 2024-08-12 RX ADMIN — FENTANYL CITRATE 25 MCG: 50 INJECTION, SOLUTION INTRAMUSCULAR; INTRAVENOUS at 16:56

## 2024-08-12 RX ADMIN — FENTANYL CITRATE 25 MCG: 50 INJECTION, SOLUTION INTRAMUSCULAR; INTRAVENOUS at 15:33

## 2024-08-12 RX ADMIN — FENTANYL CITRATE 25 MCG: 50 INJECTION, SOLUTION INTRAMUSCULAR; INTRAVENOUS at 15:51

## 2024-08-12 RX ADMIN — ROPINIROLE HYDROCHLORIDE 0.25 MG: 0.25 TABLET, FILM COATED ORAL at 21:38

## 2024-08-12 RX ADMIN — PROPOFOL 70 MG: 10 INJECTION, EMULSION INTRAVENOUS at 15:11

## 2024-08-12 NOTE — ANESTHESIA PROCEDURE NOTES
Airway  Urgency: elective    Date/Time: 8/12/2024 3:13 PM  Airway not difficult    General Information and Staff    Patient location during procedure: OR  CRNA/CAA: Taylor Block CRNA    Indications and Patient Condition  Indications for airway management: airway protection    Preoxygenated: yes  MILS maintained throughout  Mask difficulty assessment: 2 - vent by mask + OA or adjuvant +/- NMBA    Final Airway Details  Final airway type: endotracheal airway      Successful airway: ETT  Cuffed: yes   Successful intubation technique: direct laryngoscopy  Facilitating devices/methods: intubating stylet and cricoid pressure  Endotracheal tube insertion site: oral  Blade: Jamal  Blade size: 3  ETT size (mm): 7.0  Cormack-Lehane Classification: grade IIb - view of arytenoids or posterior of glottis only  Placement verified by: chest auscultation and capnometry   Measured from: lips  ETT/EBT  to lips (cm): 22  Number of attempts at approach: 1  Assessment: lips, teeth, and gum same as pre-op and atraumatic intubation

## 2024-08-12 NOTE — CONSULTS
Orthopaedic Consultation      Patient: Viri Hatfield    Date of Admission: 8/11/2024  5:52 PM    YOB: 1940    Medical Record Number: [id number]    Attending Physician:  Rosalva Condon MD    Consulting Physician:  Maximiliano Rolon PA-C        Chief Complaints: left hip pain    History of Present Illness: 84 y.o. female admitted to [facility] with left hip pain. Has severe dementia and family member is present during consultation. Per family member, nursing home staff sent her to ED due to issues weight bearing. Staff states they never witnessed a fall or injury. Imaging in ED demonstrated displaced left intertrochanteric fracture. I was consulted for further evaluation and treatment.     Allergies: No Known Allergies    Medications:   Home Medications:  (Not in a hospital admission)      Current Medications:  Scheduled Meds:famotidine, 20 mg, Oral, Daily  sodium chloride, 10 mL, Intravenous, Q12H      Continuous Infusions:   PRN Meds:.  senna-docusate sodium **AND** polyethylene glycol **AND** bisacodyl **AND** bisacodyl    Morphine    ondansetron    sodium chloride    sodium chloride    Past Medical History:   Diagnosis Date    Dementia     Pacemaker 02/21/2017    AV Eduard Gleason - Dr. Zuñiga     Syncope     x3 Jan. and Feb. 2017     Past Surgical History:   Procedure Laterality Date    BREAST LUMPECTOMY Left     HERNIA REPAIR       Social History     Occupational History    Not on file   Tobacco Use    Smoking status: Never    Smokeless tobacco: Not on file   Vaping Use    Vaping status: Never Used   Substance and Sexual Activity    Alcohol use: Never    Drug use: Never    Sexual activity: Defer      Social History     Social History Narrative    Not on file     Family History   Problem Relation Age of Onset    Cancer Mother          Review of Systems:   No other pertinent positives or negatives other than what is mentioned in the HPI and below.  Constitutional: Negative for fatigue,  fever, or weight loss  HEENT: No active headache.  Pulmonary: Patient denies SOA.  Cardiovascular: Patient denies any chest pain.  Gastrointestinal:  Patient denies active vomiting or diarrhea.  Musculoskeletal: Positive for left hip pain .  Neurological: Patient denies active dizziness or loss of consciousness.  Skin: Patient denies any active bleeding.    Vital signs in last 24 hours:  Temp:  [98.5 °F (36.9 °C)-99 °F (37.2 °C)] 99 °F (37.2 °C)  Heart Rate:  [83-92] 92  Resp:  [14-17] 17  BP: (150-171)/(71-81) 150/79  Vitals:    08/11/24 2004 08/11/24 2032 08/11/24 2216 08/12/24 0721   BP: 151/75 171/71 163/81 150/79   Patient Position:    Lying   Pulse: 83 86 92 92   Resp:    17   Temp:    99 °F (37.2 °C)   TempSrc:    Axillary   SpO2: 95% 96% 93% 97%   Weight:       Height:              Physical Exam: 84 y.o. female         General Appearance:  Alert, cooperative, in no acute distress    HEENT:    Atraumatic, Pupils are equal   Neck:   Cervical spine midline, no appreciable JVD   Lungs:     Breathing non-labored and chest rise symmetric    Heart:   Abdomen:     Rectal:       Extremities:   Pulses  Neurovascular:   Skin:   Musculoskeletal:      Pulse regular    Soft, Non-tender or distended    Deferred        No clubbing, cyanosis, or edema    Intact    Cranial nerves 2 - 12 grossly intact, sensation intact    No skin lesions    Patient difficult to examine due to dementia. TTP lateral and anterior. Pain with log roll. Unable to follow commands. Well perfused distal extremity. Calf supple.      Diagnostic Tests:    Results from last 7 days   Lab Units 08/11/24  1835   WBC 10*3/mm3 14.55*   HEMOGLOBIN g/dL 13.6   HEMATOCRIT % 41.0   PLATELETS 10*3/mm3 327     Results from last 7 days   Lab Units 08/11/24  1835   SODIUM mmol/L 140   POTASSIUM mmol/L 4.3   CHLORIDE mmol/L 103   CO2 mmol/L 24.9   BUN mg/dL 33*   CREATININE mg/dL 0.96   GLUCOSE mg/dL 128*   CALCIUM mg/dL 9.3     Results from last 7 days   Lab Units  08/11/24  1835   INR  1.05   APTT seconds 27.4*         Assessment:    Closed displaced intertrochanteric fracture of left femur    Dementia    Displaced intertrochanteric fracture of left femur, initial encounter for closed fracture        Plan:  Left IM nail planned today  Discussed operative and non operative treatment options with patients family  All questions answered to best of ability  Strict NPO  NWB  OR this afternoon/early evening  Will continue to follow     The patients family member voiced understanding of the risks, benefits, and alternative forms of treatment that were discussed and the patient consents to proceed with left IM nail.     Date: 8/12/2024  Maximiliano Rolon PA-C

## 2024-08-12 NOTE — CASE MANAGEMENT/SOCIAL WORK
Discharge Planning Assessment   Michael     Patient Name: Viri Hatfield  MRN: [id number]  Today's Date: 8/12/2024    Admit Date: 8/11/2024    Plan: D/C Plan: Return to [facility] (Memory) vs SNF Rehab; Will need tranportation at [place].   Discharge Needs Assessment       Row Name 08/12/24 1139       Living Environment    People in Home facility resident    Name(s) of People in Home [facility]    Current Living Arrangements extended care facility    In the past 12 months has the electric, gas, oil, or water company threatened to shut off services in your home? Pt Unable  Per Armen Ibarra, no financial concerns    Primary Care Provided by --  [facility]    Provides Primary Care For no one, unable/limited ability to care for self    Family Caregiver if Needed grandchild(maranda), adult    Family Caregiver Names POA-Armen, Grandson    Quality of Family Relationships supportive    Able to Return to Prior Arrangements yes    Living Arrangement Comments [facility]       Resource/Environmental Concerns    Resource/Environmental Concerns reliable transportation    Transportation Concerns rides, unreliable from others       Transportation Needs    In the past 12 months, has lack of transportation kept you from medical appointments or from getting medications? Pt Unable  Per Armen Ibarra, no financial concerns    In the past 12 months, has lack of transportation kept you from meetings, work, or from getting things needed for daily living? Pt Unable  Per Armen Ibarra, no financial concerns       Food Insecurity    Within the past 12 months, you worried that your food would run out before you got the money to buy more. Pt Unable  Per Armen Ibarra, no financial concerns    Within the past 12 months, the food you bought just didn't last and you didn't have money to get more. Pt Unable  Per Armen Ibarra, no financial concerns       Transition Planning    Patient/Family Anticipates  Transition to long-term care facility    Patient/Family Anticipated Services at Transition rehabilitation services    Transportation Anticipated health plan transportation       Discharge Needs Assessment    Readmission Within the Last 30 Days no previous admission in last 30 days    Current Outpatient/Agency/Support Group long-term acute care facility    Equipment Currently Used at Home walker, standard;wheelchair    Concerns to be Addressed discharge planning    Equipment Needed After Discharge none    Discharge Facility/Level of Care Needs nursing facility, skilled    Provided Post Acute Provider List? N/A    Provided Post Acute Provider Quality & Resource List? N/A    Patient's Choice of Community Agency(s) [facility]                   Discharge Plan       Row Name 08/12/24 1146       Plan    Plan D/C Plan: Return to [facility] (Memory) vs SNF Rehab; Will need tranportation at [place].    Patient/Family in Agreement with Plan yes    Plan Comments Attempted CM assess at bedside, pt is unable to participate due to AMS. Called and spoke with jamilah Ayers and POA. States patient is from [facility] and plan is for patient to return at [place], pharm updated in Epic. Referral sent to [facility] through [facility] and Msg sent to SHARIF Hewitt liaison. Per response patient can return at [place]. Poss need for Rehab prior to returning to memory care and Jamilah first choice is [facility] with transition back to memory care when rehab completed. Denies financial concerns. Will need transportation assist at [place]. DC Barriers: Ortho surgery, NPO, IV Pain meds                  Continued Care and Services - Admitted Since 8/11/2024       Destination       Service Provider Request Status Selected Services Address Phone Fax Patient Preferred    [facility] Pending - Request Sent N/A [address] [place] [id number] [phone] --                     Demographic Summary       Row Name  08/12/24 1136       General Information    Admission Type inpatient    Arrived From emergency department    Required Notices Provided Important Message from Medicare;other (see comments)  IMM via phone with jamilah    Reason for Consult discharge planning    Preferred Language English       Contact Information    Permission Granted to Share Info With                    Functional Status       Row Name 08/12/24 1137       Functional Status    Usual Activity Tolerance fair    Current Activity Tolerance poor       Physical Activity    On average, how many minutes do you engage in exercise at this level? Pt Unable  Per Jamilah, patient is WC only at facility       Assessment of Health Literacy    How often do you have someone help you read hospital materials? --  Pt unable to answer. Armen Ibarra is POA    How often do you have problems learning about your medical condition because of difficulty understanding written information? --  Pt unable to answer. Armen Ibarra is POA    How often do you have a problem understanding what is told to you about your medical condition? --  Pt unable to answer. Armen Ibarra is POA    How confident are you filling out medical forms by yourself? --  Pt unable to answer. Armen Ibarra is POA    Health Literacy Low  Pt unable to answer. Armen Ibarra is SHERRI       Functional Status, IADL    Medications assistive person    Meal Preparation assistive person    Housekeeping assistive person    Laundry assistive person    Shopping assistive person    IADL Comments [facility]       Mental Status    General Appearance WDL WDL       Mental Status Summary    Recent Changes in Mental Status/Cognitive Functioning unable to assess                Lynsey Sun RN    Phone [phone]  Fax [phone]

## 2024-08-12 NOTE — PROGRESS NOTES
LOS: 0 days   Patient Care Team:  Sharath Henderson MD as PCP - General  Richard Peña MD as Consulting Physician (Cardiology)    Subjective     Interval History:     Patient Complaints: sleeping    History taken from: patient    Review of Systems   Unable to perform ROS: Other           Objective     Vital Signs  Temp:  [98.5 °F (36.9 °C)-99 °F (37.2 °C)] 99 °F (37.2 °C)  Heart Rate:  [83-92] 92  Resp:  [14-17] 17  BP: (150-171)/(71-81) 150/79    Physical Exam:     General Appearance:    sleeping, in no acute distress   Head:    Normocephalic, without obvious abnormality, atraumatic   Eyes:            Lids and lashes normal, conjunctivae and sclerae normal, no   icterus, no pallor, corneas clear, PERRLA   Ears:    Ears appear intact with no abnormalities noted   Throat:   No oral lesions, no thrush, oral mucosa moist   Neck:   No adenopathy, supple, trachea midline, no thyromegaly, no   carotid bruit, no JVD   Lungs:     Clear to auscultation,respirations regular, even and                  unlabored    Heart:    Regular rhythm and normal rate, normal S1 and S2, no            murmur, no gallop, no rub, no click   Chest Wall:    No abnormalities observed   Abdomen:     Normal bowel sounds, no masses, no organomegaly, soft        non-tender, non-distended, no guarding, no rebound                tenderness   Extremities:    deformity of left hip with shortening of left leg    Pulses:   Pulses palpable and equal bilaterally   Skin:   No bleeding, bruising or rash   Lymph nodes:   No palpable adenopathy   Neurologic:   Cranial nerves 2 - 12 grossly intact, sensation intact, DTR       present and equal bilaterally        Results Review:    Lab Results (last 24 hours)       Procedure Component Value Units Date/Time    Basic Metabolic Panel [[id number]]  (Abnormal) Collected: 08/11/24 1835    Specimen: Blood Updated: 08/11/24 1907     Glucose 128 mg/dL      BUN 33 mg/dL      Creatinine 0.96 mg/dL      Sodium 140 mmol/L       Potassium 4.3 mmol/L      Chloride 103 mmol/L      CO2 24.9 mmol/L      Calcium 9.3 mg/dL      BUN/Creatinine Ratio 34.4     Anion Gap 12.1 mmol/L      eGFR 57.7 mL/min/1.73     Narrative:      GFR Normal >60  Chronic Kidney Disease <60  Kidney Failure <15    The GFR formula is only valid for adults with stable renal function between ages 18 and 70.    Protime-INR [[id number]]  (Normal) Collected: 08/11/24 1835    Specimen: Blood Updated: 08/11/24 1906     Protime 11.4 Seconds      INR 1.05    aPTT [[id number]]  (Abnormal) Collected: 08/11/24 1835    Specimen: Blood Updated: 08/11/24 1906     PTT 27.4 seconds     CBC & Differential [[id number]]  (Abnormal) Collected: 08/11/24 1835    Specimen: Blood Updated: 08/11/24 1848    Narrative:      The following orders were created for panel order CBC & Differential.  Procedure                               Abnormality         Status                     ---------                               -----------         ------                     CBC Auto Differential[[id number]]        Abnormal            Final result                 Please view results for these tests on the individual orders.    CBC Auto Differential [[id number]]  (Abnormal) Collected: 08/11/24 1835    Specimen: Blood Updated: 08/11/24 1848     WBC 14.55 10*3/mm3      RBC 4.46 10*6/mm3      Hemoglobin 13.6 g/dL      Hematocrit 41.0 %      MCV 91.9 fL      MCH 30.5 pg      MCHC 33.2 g/dL      RDW 14.0 %      RDW-SD 47.0 fl      MPV 10.1 fL      Platelets 327 10*3/mm3      Neutrophil % 81.9 %      Lymphocyte % 8.7 %      Monocyte % 8.6 %      Eosinophil % 0.0 %      Basophil % 0.3 %      Immature Grans % 0.5 %      Neutrophils, Absolute 11.92 10*3/mm3      Lymphocytes, Absolute 1.27 10*3/mm3      Monocytes, Absolute 1.25 10*3/mm3      Eosinophils, Absolute 0.00 10*3/mm3      Basophils, Absolute 0.04 10*3/mm3      Immature Grans, Absolute 0.07 10*3/mm3      nRBC 0.0 /100 WBC              Imaging Results (Last 24 Hours)        Procedure Component Value Units Date/Time    XR Chest 1 View [[id number]] Collected: 08/11/24 1844     Updated: 08/11/24 1847    Narrative:      XR CHEST 1 VW    Date of Exam: 8/11/2024 6:20 PM EDT    Indication: fall    Comparison: 3/5/2018    Findings:  Lines: Unchanged position of left subclavian dual-lead pacemaker.    Lungs: Poor respiratory effort accentuates the pulmonary vasculature and cardiomediastinal silhouette. No definite consolidation.  Pleura: No pleural effusion or pneumothorax.    Cardiomediastinum: The cardiomediastinal silhouette is normal    Soft Tissues: Unremarkable.    Bones: No acute osseous abnormality.      Impression:      Impression:  No definite acute abnormality. If symptoms persist, please consider follow-up with dedicated CT of the chest.      Electronically Signed: Faustino Payton DO    8/11/2024 6:45 PM EDT    Workstation ID: [id number]    XR Hip With or Without Pelvis 2 - 3 View Left [[id number]] Collected: 08/11/24 1843     Updated: 08/11/24 1846    Narrative:      XR HIP W OR WO PELVIS 2-3 VIEW LEFT    Date of Exam: 8/11/2024 6:20 PM EDT    Indication: fall    Comparison: 12/10/2017    Findings:  Angulated, impacted, mildly displaced and possibly comminuted fracture through the left intertrochanteric region.  The remainder the visualized osseous structures are intact. Please note that evaluation of the sacrum is very limited due to moderate to large stool burden and overlying bowel gas.  Soft tissue deformity in the left hip. Otherwise no definite focal soft tissue lesion.  Degenerative changes noted throughout the lumbar spine and bilateral hips. No definite suspicious erosive changes      Impression:      Impression:  Angulated, impacted, mildly displaced and possibly comminuted fracture through the left intertrochanteric region.      Electronically Signed: Faustino Payton DO    8/11/2024 6:44 PM EDT    Workstation ID: [id number]                 I reviewed the patient's  new clinical results.    Medication Review:   Scheduled Meds:famotidine, 20 mg, Oral, Daily  sodium chloride, 10 mL, Intravenous, Q12H      Continuous Infusions:   PRN Meds:.  senna-docusate sodium **AND** polyethylene glycol **AND** bisacodyl **AND** bisacodyl    Morphine    ondansetron    sodium chloride    sodium chloride     Assessment & Plan       Closed displaced intertrochanteric fracture of left femur    Dementia    Displaced intertrochanteric fracture of left femur, initial encounter for closed fracture  - ortho consulted  - NPO  - pain control        DVT prophylaxis- SCD's  GI prophylaxis- ppi      Plan for disposition:KAE Montoya MD  08/12/24  10:35 EDT

## 2024-08-12 NOTE — H&P
Patient Care Team:  Sharath Henderson MD as PCP - Richard Naidu MD as Consulting Physician (Cardiology)    Chief complaint left hip pain    Subjective     Patient is a 84 y.o. female who presented to the ER with complaints of left hip pain. Facility reported that patient slipped out of her wheelchair last night and has not been able to bear weight since then. She did have an xray at the nursing home which did show positive hip fracture. Patient with dementia, history obtained from ER note and staff.   ER course: Left hip x-ray is showing a left intertrochanteric femur fracture. White blood cell count is elevated at 14.55. No evidence of infection. BMP is unremarkable. CXR unremarkable.    Onset of symptoms was 1 day    Review of Systems   Unable to perform ROS: Dementia          History  Past Medical History:   Diagnosis Date    Dementia     Pacemaker 02/21/2017    [employer] - Dr. Zuñiga     Syncope     x3 Jan. and Feb. 2017     Past Surgical History:   Procedure Laterality Date    BREAST LUMPECTOMY Left     HERNIA REPAIR       Family History   Problem Relation Age of Onset    Cancer Mother      Social History     Tobacco Use    Smoking status: Never   Vaping Use    Vaping status: Never Used   Substance Use Topics    Alcohol use: Never    Drug use: Never     (Not in a hospital admission)    Allergies:  Patient has no known allergies.    Objective     Vital Signs  Temp:  [98.5 °F (36.9 °C)] 98.5 °F (36.9 °C)  Heart Rate:  [83-92] 92  Resp:  [14] 14  BP: (150-171)/(71-81) 163/81     Physical Exam:      General Appearance:    Alert, cooperative, in no acute distress   Head:    Normocephalic, without obvious abnormality, atraumatic   Eyes:            Lids and lashes normal, conjunctivae and sclerae normal, no   icterus, no pallor, corneas clear, PERRLA   Ears:    Ears appear intact with no abnormalities noted   Throat:   No oral lesions, no thrush, oral mucosa moist   Neck:   No adenopathy,  supple, trachea midline, no thyromegaly, no   carotid bruit, no JVD   Lungs:     Clear to auscultation,respirations regular, even and                  unlabored    Heart:    Regular rhythm and normal rate, normal S1 and S2, no            murmur, no gallop, no rub, no click   Chest Wall:    No abnormalities observed   Abdomen:     Normal bowel sounds, no masses, no organomegaly, soft        non-tender, non-distended, no guarding, no rebound                tenderness   Extremities:   Moves all extremities well, deformity of left hip with shortening of left leg   Pulses:   Pulses palpable and equal bilaterally   Skin:   No bleeding, bruising or rash   Lymph nodes:   No palpable adenopathy   Neurologic:   No focal deficits noted, patient with dementia at baseline. Alert to self       Results Review:     Imaging Results (Last 24 Hours)       Procedure Component Value Units Date/Time    XR Chest 1 View [[id number]] Collected: 08/11/24 1844     Updated: 08/11/24 1847    Narrative:      XR CHEST 1 VW    Date of Exam: 8/11/2024 6:20 PM EDT    Indication: fall    Comparison: 3/5/2018    Findings:  Lines: Unchanged position of left subclavian dual-lead pacemaker.    Lungs: Poor respiratory effort accentuates the pulmonary vasculature and cardiomediastinal silhouette. No definite consolidation.  Pleura: No pleural effusion or pneumothorax.    Cardiomediastinum: The cardiomediastinal silhouette is normal    Soft Tissues: Unremarkable.    Bones: No acute osseous abnormality.      Impression:      Impression:  No definite acute abnormality. If symptoms persist, please consider follow-up with dedicated CT of the chest.      Electronically Signed: Faustino Payton DO    8/11/2024 6:45 PM EDT    Workstation ID: [id number]    XR Hip With or Without Pelvis 2 - 3 View Left [[id number]] Collected: 08/11/24 1843     Updated: 08/11/24 1846    Narrative:      XR HIP W OR WO PELVIS 2-3 VIEW LEFT    Date of Exam: 8/11/2024 6:20 PM  EDT    Indication: fall    Comparison: 12/10/2017    Findings:  Angulated, impacted, mildly displaced and possibly comminuted fracture through the left intertrochanteric region.  The remainder the visualized osseous structures are intact. Please note that evaluation of the sacrum is very limited due to moderate to large stool burden and overlying bowel gas.  Soft tissue deformity in the left hip. Otherwise no definite focal soft tissue lesion.  Degenerative changes noted throughout the lumbar spine and bilateral hips. No definite suspicious erosive changes      Impression:      Impression:  Angulated, impacted, mildly displaced and possibly comminuted fracture through the left intertrochanteric region.      Electronically Signed: Faustino Payton DO    8/11/2024 6:44 PM EDT    Workstation ID: [id number]             Lab Results (last 24 hours)       Procedure Component Value Units Date/Time    Basic Metabolic Panel [[id number]]  (Abnormal) Collected: 08/11/24 1835    Specimen: Blood Updated: 08/11/24 1907     Glucose 128 mg/dL      BUN 33 mg/dL      Creatinine 0.96 mg/dL      Sodium 140 mmol/L      Potassium 4.3 mmol/L      Chloride 103 mmol/L      CO2 24.9 mmol/L      Calcium 9.3 mg/dL      BUN/Creatinine Ratio 34.4     Anion Gap 12.1 mmol/L      eGFR 57.7 mL/min/1.73     Narrative:      GFR Normal >60  Chronic Kidney Disease <60  Kidney Failure <15    The GFR formula is only valid for adults with stable renal function between ages 18 and 70.    Protime-INR [[id number]]  (Normal) Collected: 08/11/24 1835    Specimen: Blood Updated: 08/11/24 1906     Protime 11.4 Seconds      INR 1.05    aPTT [[id number]]  (Abnormal) Collected: 08/11/24 1835    Specimen: Blood Updated: 08/11/24 1906     PTT 27.4 seconds     CBC & Differential [[id number]]  (Abnormal) Collected: 08/11/24 1835    Specimen: Blood Updated: 08/11/24 1848    Narrative:      The following orders were created for panel order CBC & Differential.  Procedure                                Abnormality         Status                     ---------                               -----------         ------                     CBC Auto Differential[[id number]]        Abnormal            Final result                 Please view results for these tests on the individual orders.    CBC Auto Differential [[id number]]  (Abnormal) Collected: 08/11/24 1835    Specimen: Blood Updated: 08/11/24 1848     WBC 14.55 10*3/mm3      RBC 4.46 10*6/mm3      Hemoglobin 13.6 g/dL      Hematocrit 41.0 %      MCV 91.9 fL      MCH 30.5 pg      MCHC 33.2 g/dL      RDW 14.0 %      RDW-SD 47.0 fl      MPV 10.1 fL      Platelets 327 10*3/mm3      Neutrophil % 81.9 %      Lymphocyte % 8.7 %      Monocyte % 8.6 %      Eosinophil % 0.0 %      Basophil % 0.3 %      Immature Grans % 0.5 %      Neutrophils, Absolute 11.92 10*3/mm3      Lymphocytes, Absolute 1.27 10*3/mm3      Monocytes, Absolute 1.25 10*3/mm3      Eosinophils, Absolute 0.00 10*3/mm3      Basophils, Absolute 0.04 10*3/mm3      Immature Grans, Absolute 0.07 10*3/mm3      nRBC 0.0 /100 WBC              I reviewed the patient's new clinical results.    Assessment & Plan     Fall  Displaced intertrochanteric fracture of left femur  -wbc 14  -keep npo after mn  -consulted ortho  -pain control      DVT prophylaxis- SCD's  GI prophylaxis- ppi    I discussed the patient's findings and my recommendations with patient.     Kylie Fry, APRN  08/12/24  04:04 EDT

## 2024-08-12 NOTE — PLAN OF CARE
Problem: Adult Inpatient Plan of Care  Goal: Plan of Care Review  Outcome: Ongoing, Progressing  Flowsheets (Taken 8/12/2024 0118)  Progress: no change  Plan of Care Reviewed With: patient  Goal: Patient-Specific Goal (Individualized)  Outcome: Ongoing, Progressing  Goal: Absence of Hospital-Acquired Illness or Injury  Outcome: Ongoing, Progressing  Goal: Optimal Comfort and Wellbeing  Outcome: Ongoing, Progressing  Goal: Readiness for Transition of Care  Outcome: Ongoing, Progressing     Problem: Fall Injury Risk  Goal: Absence of Fall and Fall-Related Injury  Outcome: Ongoing, Progressing     Problem: Breathing Pattern Ineffective  Goal: Effective Breathing Pattern  Outcome: Ongoing, Progressing   Goal Outcome Evaluation:  Plan of Care Reviewed With: patient        Progress: no change

## 2024-08-12 NOTE — OP NOTE
HIP TROCHANTERIC NAILING SHORT WITH INTRAMEDULLARY HIP SCREW  Procedure Note    Viri Hatfield  8/12/2024    Pre-op Diagnosis: Left intertrochanteric hip fracture   Post-op Diagnosis: Same  Procedure: Left hip cephalomedullary nail  Surgeon:  Tylor Sampson MD  Assistant: None  Anesthesia: General, Anesthesiologist: Jamaal Cheatham MD  CRNA: Taylor Block CRNA  Staff: Circulator: Frandy Quezada RN; Edgardo Calero RN  Radiology Technologist: Taylor Shea  Scrub Person: Jessica Keen  Vendor Representative: Andrew Cabrera CFA  Estimated Blood Loss: 100ml  Specimens:   Order Name Source Comment Collection Info Order Time   TYPE AND SCREEN   Collected By: Abi Boo RN 8/12/2024 10:13 AM     Release to patient   Routine Release          Drains: none  Complications: None    Components Utilized:    Implant Name Type Inv. Item Serial No.  Lot No. LRB No. Used Action   NAIL IM/FEM CEPH IKE TI 21.5CM 10MM 125DEG LT - [id number] Implant NAIL IM/FEM CEPH IKE TI 21.5CM 10MM 125DEG LT  [employer] [id number] Left 1 Implanted   SCRW LAG CEPH TI 10.5X95MM - [id number] Implant SCRW LAG CEPH TI 10.5X95MM  [employer] [id number] Left 1 Implanted   SCRW ELISA FA FUL/THRD HEX3.5 TI 5X30MM - [id number] Implant SCRW ELISA FA FUL/THRD HEX3.5 TI 5X30MM  [employer] [id number] Left 1 Implanted       Indication for Procedure:  This patient is a 84 y.o. female who fell at her rehab facility resulting in a left intertrochanteric hip fracture.  She was seen in the emergency department and noted to have a fracture.  She was admitted for surgical intervention.  It was felt she would benefit from an intramedullary nail.  Surgical options and non-surgical options were discussed in detail and to the patient's satisfaction.  Surgical intervention was recommended based on the patient's injury and functional status.      The risks and benefits of surgery were discussed with patient and informed consent was obtained.   Risks include but are not limited to, infection, bleeding, nerve injury, blood clots, risks associated with anesthesia, need for further surgery, persistent pain, and possibly death.    Protocols for intravenous antibiotics and venous thrombosis were followed for this patient.  IV antibiotics were infused prior to surgery and will be discontinued within 24 hours of completion of the surgical procedure.       DESCRIPTION OF PROCEDURE:     The patient was seen in Preoperative Holding Area where her left hip surgical site was marked. Preoperative antibiotics were received. H&P and consent updated.  She was taken to the Operating Room and provided general anesthesia on the Operating Room table.  She was moved to the [place] table.  The left lower extremity was prepped and draped in typical sterile fashion.  Timeout was performed confirm the correct surgical site and procedure.  Traction had been placed on the leg to reduce the hip.  Was confirmed with fluoroscopy.  Longitudinal incision was made just proximal to the greater trochanter.  Starting awl was placed on the tip of the trochanter and inserted into the femur.  Long ball-tipped guidewire was advanced down the femur.  Opening reamer was utilized followed by flexible reamers to a size 11.5 mm.  Short nail was chosen.  This was a 10 x 125 degree neck shaft angle.  It was inserted to the appropriate depth.  Ball-tipped guidewire was removed.  Second incision was made for the hip screw.  Guide was placed down to bone.  Drill tip guidewire was advanced into the center-center position of the femoral head on AP and lateral planes.  It was measured and reamed.  Hip screw was placed.  Setscrew was locked.  Traction was released.    Third incision was made for a static bicortical locking screw.  This was placed in standard fashion.  Jig was removed.  Final AP and lateral images were taken confirming appropriate placement of the hardware and the reduction of the fracture.   Wound was copiously irrigated.  Hemostasis noted.  Deep tissue closed with 0 Vicryl suture followed by 2-0 Vicryl for subtenons tissues.  Skin was closed with staples.  Sterile dressings were applied.  Sponge needle counts were appropriate.  Patient was taken the PACU postoperatively in stable condition.    Postoperative Plan:  Protocols for intravenous antibiotics and venous thrombosis were followed for this patient.  IV antibiotics were infused prior to surgery and will be discontinued within 24 hours of completion of the surgical procedure.  Thrombosis prophylaxis will be initiated within 24 hours of the completion of the surgical procedure.  The patient will be weight bearing as tolerated on the left lower extremity.    No complications were encountered during the surgical procedure.    Tylor Sampson MD

## 2024-08-12 NOTE — ANESTHESIA PREPROCEDURE EVALUATION
Anesthesia Evaluation     Patient summary reviewed and Nursing notes reviewed   no history of anesthetic complications:   NPO Solid Status: > 8 hours  NPO Liquid Status: > 8 hours           Airway   Mallampati: II  TM distance: >3 FB  Neck ROM: full  No difficulty expected  Dental      Pulmonary - negative pulmonary ROS and normal exam   Cardiovascular - normal exam    (+) pacemaker pacemaker, dysrhythmias      Neuro/Psych  (+) syncope, dementia  GI/Hepatic/Renal/Endo - negative ROS     Musculoskeletal (-) negative ROS    Abdominal  - normal exam   Substance History - negative use     OB/GYN negative ob/gyn ROS         Other                          Anesthesia Plan    ASA 3     general     intravenous induction     Anesthetic plan, risks, benefits, and alternatives have been provided, discussed and informed consent has been obtained with: patient.    Plan discussed with CRNA.        CODE STATUS:    Level Of Support Discussed With: Patient  Code Status (Patient has no pulse and is not breathing): CPR (Attempt to Resuscitate)  Medical Interventions (Patient has pulse or is breathing): Full Support

## 2024-08-13 LAB
BACTERIA UR QL AUTO: ABNORMAL /HPF
BILIRUB UR QL STRIP: NEGATIVE
CLARITY UR: ABNORMAL
COLOR UR: YELLOW
GLUCOSE UR STRIP-MCNC: NEGATIVE MG/DL
HGB UR QL STRIP.AUTO: ABNORMAL
HYALINE CASTS UR QL AUTO: ABNORMAL /LPF
KETONES UR QL STRIP: ABNORMAL
LEUKOCYTE ESTERASE UR QL STRIP.AUTO: ABNORMAL
NITRITE UR QL STRIP: NEGATIVE
PH UR STRIP.AUTO: 6.5 [PH] (ref 5–8)
PROT UR QL STRIP: ABNORMAL
RBC # UR STRIP: ABNORMAL /HPF
REF LAB TEST METHOD: ABNORMAL
SP GR UR STRIP: 1.03 (ref 1–1.03)
SQUAMOUS #/AREA URNS HPF: ABNORMAL /HPF
UROBILINOGEN UR QL STRIP: ABNORMAL
WBC # UR STRIP: ABNORMAL /HPF

## 2024-08-13 PROCEDURE — 97162 PT EVAL MOD COMPLEX 30 MIN: CPT

## 2024-08-13 PROCEDURE — 25010000002 CEFAZOLIN PER 500 MG: Performed by: ORTHOPAEDIC SURGERY

## 2024-08-13 PROCEDURE — 81001 URINALYSIS AUTO W/SCOPE: CPT | Performed by: NURSE PRACTITIONER

## 2024-08-13 PROCEDURE — 25810000003 SODIUM CHLORIDE 0.9 % SOLUTION: Performed by: NURSE PRACTITIONER

## 2024-08-13 PROCEDURE — 87086 URINE CULTURE/COLONY COUNT: CPT | Performed by: NURSE PRACTITIONER

## 2024-08-13 RX ORDER — SODIUM CHLORIDE 9 MG/ML
75 INJECTION, SOLUTION INTRAVENOUS CONTINUOUS
Status: DISCONTINUED | OUTPATIENT
Start: 2024-08-13 | End: 2024-08-15

## 2024-08-13 RX ADMIN — FAMOTIDINE 20 MG: 20 TABLET, FILM COATED ORAL at 09:52

## 2024-08-13 RX ADMIN — SODIUM CHLORIDE 2000 MG: 900 INJECTION INTRAVENOUS at 00:03

## 2024-08-13 RX ADMIN — ASPIRIN 81 MG CHEWABLE TABLET 81 MG: 81 TABLET CHEWABLE at 20:10

## 2024-08-13 RX ADMIN — ROPINIROLE HYDROCHLORIDE 0.25 MG: 0.25 TABLET, FILM COATED ORAL at 20:10

## 2024-08-13 RX ADMIN — SODIUM CHLORIDE 75 ML/HR: 9 INJECTION, SOLUTION INTRAVENOUS at 13:33

## 2024-08-13 RX ADMIN — ESCITALOPRAM OXALATE 10 MG: 10 TABLET ORAL at 09:52

## 2024-08-13 RX ADMIN — Medication 10 ML: at 09:00

## 2024-08-13 RX ADMIN — Medication 10 ML: at 20:10

## 2024-08-13 RX ADMIN — ASPIRIN 81 MG CHEWABLE TABLET 81 MG: 81 TABLET CHEWABLE at 09:51

## 2024-08-13 RX ADMIN — POLYETHYLENE GLYCOL 3350 17 G: 17 POWDER, FOR SOLUTION ORAL at 09:52

## 2024-08-13 RX ADMIN — SODIUM CHLORIDE 2000 MG: 900 INJECTION INTRAVENOUS at 07:26

## 2024-08-13 NOTE — PLAN OF CARE
Goal Outcome Evaluation: Pt disoriented to place person and self. Call light in reach and pt unable to make needs known. Dressings clean dry and intact. Pt up to chair and tolerated well. Care plan ongoing.

## 2024-08-13 NOTE — PLAN OF CARE
Goal Outcome Evaluation:                 Pt is resting quietly abed with eyes closed at this time. Pt is noted to make eye contact when spoken to, but does not respond. Pt is also noted to have a flat affact. Per family this is her baseline. Pt is cooperative with staff. She did not make any attempts to get out of bed or pull on her IV lines. No s/sx of any ASE are noted r/t IV ABT. Family was at bedside and fed pt dinner after arriving on unit. Pt is noted to take meds whole in apple sauce and water without difficulty. Pt is in low position with call light in reach. Care plan is ongoing.

## 2024-08-13 NOTE — PLAN OF CARE
"Goal Outcome Evaluation:  Plan of Care Reviewed With: patient   Pt presents as an 83 y/o  F admitted to [facility] on 8/11/24 with left hip pain after pt slipped out of w/c. X-ray left hip: Angulated, impacted, mildly displaced and possibly comminuted fracture through the left intertrochanteric region. CXR: No definite acute abnormality. Pt has arthritic deformities of hands. Pt is POD # 1 for Left hip IM nailing per Dr. Sampson. Pt is WBAT LLE. Medical Hx significant for dementia and Pacemaker. AT baseline, pt lives at [facility] and pivots with assist to a w/c. Pt pleasant and cooperative. Pt alert and only verbalizes twice during PT eval the words \"yes\" and \"thank you.\" Pt has restless pumping of bilateral ankles continuously. VSS with O2 at 2 L. This date, pt transferred supine>sit with mod assist of 2 with use of draw sheet. Pt sat well at edge of bed with close supervision. Pt transferred bed to recliner AT E with max assist of 1 plus one person to manage equipment. Pt had good tolerance of the transfer. PT will follow pt remotely as pt was non-ambulatory at baseline. PT recommendation is Skilled Nursing Facility.                  Anticipated Discharge Disposition (PT): skilled nursing facility                        "

## 2024-08-13 NOTE — ANESTHESIA POSTPROCEDURE EVALUATION
Patient: Viri Hatfield    Procedure Summary       Date: 08/12/24 Room / Location: [facility] OR 11 / [facility] MAIN OR    Anesthesia Start: 1509 Anesthesia Stop: 1608    Procedure: HIP TROCHANTERIC NAILING SHORT WITH INTRAMEDULLARY HIP SCREW (Left: Thigh) Diagnosis:       Displaced intertrochanteric fracture of left femur, initial encounter for closed fracture      (Displaced intertrochanteric fracture of left femur, initial encounter for closed fracture [S72.142A])    Surgeons: Tylor Sampson MD Provider: Jamaal Cheatham MD    Anesthesia Type: general ASA Status: 3            Anesthesia Type: general    Vitals  Vitals Value Taken Time   /62 08/12/24 1812   Temp 98.2 °F (36.8 °C) 08/12/24 1805   Pulse 92 08/12/24 1814   Resp 14 08/12/24 1805   SpO2 97 % 08/12/24 1814   Vitals shown include unfiled device data.        Post Anesthesia Care and Evaluation    Patient location during evaluation: PACU  Patient participation: complete - patient participated  Level of consciousness: awake  Pain scale: See nurse's notes for pain score.  Pain management: adequate    Airway patency: patent  Anesthetic complications: No anesthetic complications  PONV Status: none  Cardiovascular status: acceptable  Respiratory status: acceptable and spontaneous ventilation  Hydration status: acceptable    Comments: Patient seen and examined postoperatively; vital signs stable; SpO2 greater than or equal to 90%; cardiopulmonary status stable; nausea/vomiting adequately controlled; pain adequately controlled; no apparent anesthesia complications; patient discharged from anesthesia care when discharge criteria were met

## 2024-08-13 NOTE — THERAPY EVALUATION
"Patient Name: Viri Hatfield  : 1940    MRN: [id number]                              Today's Date: 2024       Admit Date: 2024    Visit Dx:     ICD-10-CM ICD-9-CM   1. Displaced intertrochanteric fracture of left femur, initial encounter for closed fracture  S72.142A 820.21   2. Fall, initial encounter  W19.XXXA E888.9     Patient Active Problem List   Diagnosis    Presence of cardiac pacemaker    Second degree atrioventricular block    Syncope    Dementia    Closed displaced intertrochanteric fracture of left femur    Displaced intertrochanteric fracture of left femur, initial encounter for closed fracture     Past Medical History:   Diagnosis Date    Dementia     Pacemaker 2017    AV Eduard Gleason - Dr. Zuñiga     Syncope     x3  and 2017     Past Surgical History:   Procedure Laterality Date    BREAST LUMPECTOMY Left     HERNIA REPAIR        General Information       Row Name 24 1111          Physical Therapy Time and Intention    Document Type evaluation  -BR     Mode of Treatment physical therapy  -BR       Row Name 24 1111          General Information    Patient Profile Reviewed yes  -BR     Prior Level of Function transfer;mod assist:;max assist:  -BR     Existing Precautions/Restrictions fall;oxygen therapy device and L/min  -BR     Barriers to Rehab cognitive status;previous functional deficit  -BR       Row Name 24 1111          Living Environment    People in Home facility resident;other (see comments)  Pt is a resident of [facility].  -BR       Row Name 24 1111          Cognition    Orientation Status (Cognition) disoriented to;person;place;situation;time;unable/difficult to assess  Pt repled \"yes\" when asked if she was cold. Pt said \"thank you\" once but pt made no other verbalization.  -BR       Row Name 24 1111          Safety Issues, Functional Mobility    Impairments Affecting Function (Mobility) range of motion " (ROM);balance;endurance/activity tolerance;strength  -BR               User Key  (r) = Recorded By, (t) = Taken By, (c) = Cosigned By      Initials Name Provider Type    Ynes Bui PT Physical Therapist                   Mobility       Row Name 08/13/24 1114          Bed Mobility    Bed Mobility supine-sit  -BR     Supine-Sit [place] (Bed Mobility) moderate assist (50% patient effort);2 person assist  -BR     Assistive Device (Bed Mobility) draw sheet  -BR       Row Name 08/13/24 1114          Bed-Chair Transfer    Bed-Chair [place] (Transfers) maximum assist (25% patient effort);1 person assist;1 person to manage equipment  -BR     Comment, (Bed-Chair Transfer) Pt transferred towards strong side.  -BR       Row Name 08/13/24 0832          Gait/Stairs (Locomotion)    Patient was able to Ambulate no, other medical factors prevent ambulation  -BR     Reason Patient was unable to Ambulate Non-Ambulatory at Baseline  -BR     Distance in Feet (Gait) 0  Pt performed squat-pivot-sit transfer bed to recliner with max assist  -BR       Row Name 08/13/24 1114          Mobility    Extremity Weight-bearing Status left lower extremity  -BR     Left Lower Extremity (Weight-bearing Status) weight-bearing as tolerated (WBAT)  -BR               User Key  (r) = Recorded By, (t) = Taken By, (c) = Cosigned By      Initials Name Provider Type    Ynes Bui PT Physical Therapist                   Obj/Interventions       Row Name 08/13/24 1115          Range of Motion Comprehensive    Comment, General Range of Motion Pt has arthritic deformities B hands; AAROM BLE with ~ 505 limitaion Bilateral hips and knees; Ankles WFL  -BR       Row Name 08/13/24 1115          Strength Comprehensive (MMT)    Comment, General Manual Muscle Testing (MMT) Assessment pt has weak grasp Bilaterally. Pt not able to follow commands for strength testing; grossly 2+/5 BLE  -BR       Row Name 08/13/24 1115          Balance     Balance Assessment sitting static balance;sitting dynamic balance  -BR     Static Sitting Balance supervision  -BR     Dynamic Sitting Balance contact guard  -BR     Position, Sitting Balance unsupported;sitting edge of bed  -BR       Row Name 08/13/24 1115          Sensory Assessment (Somatosensory)    Sensory Assessment (Somatosensory) unable/difficult to assess  -BR               User Key  (r) = Recorded By, (t) = Taken By, (c) = Cosigned By      Initials Name Provider Type    Ynes Bui PT Physical Therapist                   Goals/Plan       Row Name 08/13/24 1132          Bed Mobility Goal 1 (PT)    Activity/Assistive Device (Bed Mobility Goal 1, PT) bed mobility activities, all  -BR     [place] Level/Cues Needed (Bed Mobility Goal 1, PT) moderate assist (50-74% patient effort)  -BR     Time Frame (Bed Mobility Goal 1, PT) long term goal (LTG);2 weeks  -BR       Row Name 08/13/24 1132          Transfer Goal 1 (PT)    Activity/Assistive Device (Transfer Goal 1, PT) bed-to-chair/chair-to-bed  -BR     [place] Level/Cues Needed (Transfer Goal 1, PT) moderate assist (50-74% patient effort)  -BR     Time Frame (Transfer Goal 1, PT) long term goal (LTG);2 weeks  -BR       Row Name 08/13/24 1132          Therapy Assessment/Plan (PT)    Planned Therapy Interventions (PT) balance training;bed mobility training;patient/family education;transfer training;strengthening;ROM (range of motion)  -BR               User Key  (r) = Recorded By, (t) = Taken By, (c) = Cosigned By      Initials Name Provider Type    Ynes Bui PT Physical Therapist                   Clinical Impression       Row Name 08/13/24 1118          Pain    Additional Documentation Pain Scale: FACES Pre/Post-Treatment (Group)  -BR       Row Name 08/13/24 1118          Pain Scale: FACES Pre/Post-Treatment    Pain: FACES Scale, Pretreatment 0-->no hurt  -BR     Posttreatment Pain Rating 0-->no hurt  -BR       Row Name 08/13/24  "1118          Plan of Care Review    Plan of Care Reviewed With patient  -BR     Outcome Evaluation Pt presents as an 85 y/o  F admitted to [facility] on 8/11/24 with left hip pain after pt slipped out of w/c. X-ray left hip: Angulated, impacted, mildly displaced and possibly comminuted fracture through the left intertrochanteric region. CXR: No definite acute abnormality. Pt has arthritic deformities of hands. Pt is POD # 1 for Left hip IM nailing per Dr. Sampson. Pt is WBAT LLE. Medical Hx significant for dementia and Pacemaker. AT baseline, pt lives at [facility] and pivots with assist to a w/c. Pt pleasant and cooperative. Pt alert and only verbalizes twice during PT eval the words \"yes\" and \"thank you.\" Pt has restless pumping of bilateral ankles continuously. VSS with O2 at 2 L. This date, pt transferred supine>sit with mod assist of 2 with use of draw sheet. Pt sat well at edge of bed with close supervision. Pt transferred bed to recliner AT E with max assist of 1 plus one person to manage equipment. Pt had good tolerance of the transfer. PT will follow pt remotely as pt was non-ambulatory at baseline. PT recommendation is Skilled Nursing Facility.  -BR       Row Name 08/13/24 1118          Therapy Assessment/Plan (PT)    Rehab Potential (PT) good, to achieve stated therapy goals  -BR     Criteria for Skilled Interventions Met (PT) yes;meets criteria;skilled treatment is necessary  -BR     Therapy Frequency (PT) 3 times/wk  -BR       Row Name 08/13/24 1118          Vital Signs    Pre Systolic BP Rehab 133  -BR     Pre Treatment Diastolic BP 57  -BR     Posttreatment Heart Rate (beats/min) 86  -BR     Pre SpO2 (%) 97  -BR     O2 Delivery Pre Treatment nasal cannula  2L  -BR     Post SpO2 (%) 96  -BR     O2 Delivery Post Treatment nasal cannula  2L  -BR     Pre Patient Position Supine  -BR     Intra Patient Position Standing  -BR     Post Patient Position Sitting  -BR       Row Name 08/13/24 1118          " Positioning and Restraints    Pre-Treatment Position in bed  -BR     Post Treatment Position chair  -BR     In Chair notified nsg;reclined;call light within reach;encouraged to call for assist;exit alarm on;legs elevated  -BR               User Key  (r) = Recorded By, (t) = Taken By, (c) = Cosigned By      Initials Name Provider Type    Ynes Bui PT Physical Therapist                   Outcome Measures       Row Name 08/13/24 1133          How much help from another person do you currently need...    Turning from your back to your side while in flat bed without using bedrails? 2  -BR     Moving from lying on back to sitting on the side of a flat bed without bedrails? 2  -BR     Moving to and from a bed to a chair (including a wheelchair)? 2  -BR     Standing up from a chair using your arms (e.g., wheelchair, bedside chair)? 1  -BR     Climbing 3-5 steps with a railing? 1  -BR     To walk in hospital room? 1  -BR     AM-PAC 6 Clicks Score (PT) 9  -BR     Highest Level of Mobility Goal 3 --> Sit at edge of bed  -BR       Row Name 08/13/24 1133          Functional Assessment    Outcome Measure Options AM-PAC 6 Clicks Basic Mobility (PT)  -BR               User Key  (r) = Recorded By, (t) = Taken By, (c) = Cosigned By      Initials Name Provider Type    Ynes Bui PT Physical Therapist                                 Physical Therapy Education       Title: PT OT SLP Therapies (In Progress)       Topic: Physical Therapy (In Progress)       Point: Mobility training (In Progress)       Learning Progress Summary             Patient Acceptance, E,D, NR,NL by BR at 8/13/2024 1133                         Point: Body mechanics (In Progress)       Learning Progress Summary             Patient Acceptance, E,D, NR,NL by BR at 8/13/2024 1133                         Point: Precautions (In Progress)       Learning Progress Summary             Patient Acceptance, E,D, NR,NL by BR at 8/13/2024 1133             "                             User Key       Initials Effective Dates Name Provider Type Discipline     02/01/22 -  Ynes Tony, PT Physical Therapist PT                  PT Recommendation and Plan  Planned Therapy Interventions (PT): balance training, bed mobility training, patient/family education, transfer training, strengthening, ROM (range of motion)  Plan of Care Reviewed With: patient  Outcome Evaluation: Pt presents as an 85 y/o  F admitted to [facility] on 8/11/24 with left hip pain after pt slipped out of w/c. X-ray left hip: Angulated, impacted, mildly displaced and possibly comminuted fracture through the left intertrochanteric region. CXR: No definite acute abnormality. Pt has arthritic deformities of hands. Pt is POD # 1 for Left hip IM nailing per Dr. Sampson. Pt is WBAT LLE. Medical Hx significant for dementia and Pacemaker. AT baseline, pt lives at [facility] and pivots with assist to a w/c. Pt pleasant and cooperative. Pt alert and only verbalizes twice during PT eval the words \"yes\" and \"thank you.\" Pt has restless pumping of bilateral ankles continuously. VSS with O2 at 2 L. This date, pt transferred supine>sit with mod assist of 2 with use of draw sheet. Pt sat well at edge of bed with close supervision. Pt transferred bed to recliner WBAT LLE with max assist of 1 plus one person to manage equipment. Pt had good tolerance of the transfer. PT will follow pt remotely as pt was non-ambulatory at baseline. PT recommendation is Skilled Nursing Facility.     Time Calculation:         PT Charges       Row Name 08/13/24 1134             Time Calculation    Start Time 0832  -BR      Stop Time 0900  -BR      Time Calculation (min) 28 min  -BR      PT Received On 08/13/24  -BR      PT - Next Appointment 08/15/24  -BR      PT Goal Re-Cert Due Date 08/27/24  -BR         Time Calculation- PT    Total Timed Code Minutes- PT 0 minute(s)  -BR                User Key  (r) = Recorded By, (t) = Taken By, (c) " = Cosigned By      Initials Name Provider Type    BR Ynes Tony, PT Physical Therapist                  Therapy Charges for Today       Code Description Service Date Service Provider Modifiers Qty    [id number] HC PT EVAL MOD COMPLEXITY 4 8/13/2024 Ynes Tony, PT GP 1            PT G-Codes  Outcome Measure Options: AM-PAC 6 Clicks Basic Mobility (PT)  AM-PAC 6 Clicks Score (PT): 9  PT Discharge Summary  Anticipated Discharge Disposition (PT): skilled nursing facility    Ynes Tony, VALERY  8/13/2024

## 2024-08-13 NOTE — PROGRESS NOTES
LOS: 1 day   Patient Care Team:  Sharath Henderson MD as PCP - General  Richard Peña MD as Consulting Physician (Cardiology)    Subjective         Review of Systems   Unable to perform ROS: Dementia           Objective     Vital Signs  Temp:  [98.1 °F (36.7 °C)-99.8 °F (37.7 °C)] 98.1 °F (36.7 °C)  Heart Rate:  [] 90  Resp:  [11-17] 17  BP: (117-174)/(52-82) 133/57      Physical Exam  Vitals reviewed.   Constitutional:       Appearance: She is not ill-appearing.   HENT:      Head: Normocephalic and atraumatic.      Right Ear: External ear normal.      Left Ear: External ear normal.      Nose: Nose normal.      Mouth/Throat:      Mouth: Mucous membranes are moist.   Eyes:      General:         Right eye: No discharge.         Left eye: No discharge.   Cardiovascular:      Rate and Rhythm: Normal rate and regular rhythm.      Pulses: Normal pulses.      Heart sounds: Normal heart sounds.   Pulmonary:      Effort: Pulmonary effort is normal.      Breath sounds: Normal breath sounds.   Abdominal:      General: Bowel sounds are normal.      Palpations: Abdomen is soft.   Musculoskeletal:         General: Normal range of motion.      Cervical back: Normal range of motion.   Skin:     General: Skin is warm and dry.   Neurological:      Mental Status: She is alert and oriented to person, place, and time.   Psychiatric:         Behavior: Behavior normal.              Results Review:    Lab Results (last 24 hours)       Procedure Component Value Units Date/Time    Basic Metabolic Panel [[id number]]  (Abnormal) Collected: 08/12/24 2323    Specimen: Blood Updated: 08/12/24 2351     Glucose 207 mg/dL      BUN 39 mg/dL      Creatinine 1.14 mg/dL      Sodium 142 mmol/L      Potassium 4.6 mmol/L      Chloride 108 mmol/L      CO2 25.0 mmol/L      Calcium 8.2 mg/dL      BUN/Creatinine Ratio 34.2     Anion Gap 9.0 mmol/L      eGFR 47.0 mL/min/1.73     Narrative:      GFR Normal >60  Chronic Kidney Disease <60  Kidney  Failure <15    The GFR formula is only valid for adults with stable renal function between ages 18 and 70.    CBC & Differential [[id number]]  (Abnormal) Collected: 08/12/24 2323    Specimen: Blood Updated: 08/12/24 2331    Narrative:      The following orders were created for panel order CBC & Differential.  Procedure                               Abnormality         Status                     ---------                               -----------         ------                     CBC Auto Differential[[id number]]        Abnormal            Final result                 Please view results for these tests on the individual orders.    CBC Auto Differential [[id number]]  (Abnormal) Collected: 08/12/24 2323    Specimen: Blood Updated: 08/12/24 2331     WBC 14.68 10*3/mm3      RBC 3.91 10*6/mm3      Hemoglobin 12.0 g/dL      Hematocrit 36.7 %      MCV 93.9 fL      MCH 30.7 pg      MCHC 32.7 g/dL      RDW 14.0 %      RDW-SD 48.3 fl      MPV 10.1 fL      Platelets 268 10*3/mm3      Neutrophil % 85.8 %      Lymphocyte % 4.4 %      Monocyte % 8.9 %      Eosinophil % 0.1 %      Basophil % 0.1 %      Immature Grans % 0.7 %      Neutrophils, Absolute 12.60 10*3/mm3      Lymphocytes, Absolute 0.64 10*3/mm3      Monocytes, Absolute 1.31 10*3/mm3      Eosinophils, Absolute 0.01 10*3/mm3      Basophils, Absolute 0.02 10*3/mm3      Immature Grans, Absolute 0.10 10*3/mm3      nRBC 0.0 /100 WBC              Imaging Results (Last 24 Hours)       Procedure Component Value Units Date/Time    FL C Arm During Surgery [[id number]] Resulted: 08/12/24 1603     Updated: 08/12/24 1603    Narrative:      This procedure was auto-finalized with no dictation required.    XR Hip With or Without Pelvis 2 - 3 View Left [[id number]] Resulted: 08/12/24 1603     Updated: 08/12/24 1603    Narrative:      This procedure was auto-finalized with no dictation required.                 I reviewed the patient's new clinical results.    Medication Review:   Scheduled  Meds:aspirin, 81 mg, Oral, BID  bisacodyl, 10 mg, Rectal, See Admin Instructions  escitalopram, 10 mg, Oral, Daily  famotidine, 20 mg, Oral, Daily  polyethylene glycol, 17 g, Oral, Daily  rOPINIRole, 0.25 mg, Oral, Nightly  sodium chloride, 10 mL, Intravenous, Q12H      Continuous Infusions:sodium chloride, 75 mL/hr      PRN Meds:.  acetaminophen    senna-docusate sodium **AND** polyethylene glycol **AND** bisacodyl **AND** bisacodyl    HYDROcodone-acetaminophen    hyoscyamine    Morphine    ondansetron    sodium chloride    sodium chloride     Interval History:    Assessment & Plan     Closed displaced intertrochanteric fracture of left femur  -s/p surgical intervention  -doing well ortho signed off f/u 2 weeks    Leukocytosis with low temp  -get u/a culture  -if neg may need ct chest     Dementia  -pleasantly confused non verbal    Plan for disposition:[facility]    VANESSA Vazquez  08/13/24  11:22 EDT

## 2024-08-13 NOTE — PROGRESS NOTES
"     Orthopedic Progress Note    Subjective :   Patient seen and examined.  Resting comfortably in her hospital bed.  Family member at bedside.  No acute issues overnight.    Objective :    Vital signs in last 24 hours:  Temp:  [98.1 °F (36.7 °C)-99.8 °F (37.7 °C)] 98.1 °F (36.7 °C)  Heart Rate:  [] 90  Resp:  [11-17] 17  BP: (117-174)/(52-83) 133/57  Vitals:    08/12/24 1831 08/13/24 0000 08/13/24 0448 08/13/24 0756   BP: 145/82 117/59 131/64 133/57   BP Location: Left arm Left arm Left arm Left arm   Patient Position: Lying Lying Lying Lying   Pulse:  89 79 90   Resp: 17 17 15 17   Temp: 98.1 °F (36.7 °C) 98.5 °F (36.9 °C) 98.1 °F (36.7 °C) 98.1 °F (36.7 °C)   TempSrc: Axillary Axillary Oral Axillary   SpO2: (!) 82% 94% 96% 95%   Weight: 56.3 kg (124 lb 1.9 oz)      Height: 157.5 cm (62.01\")          PHYSICAL EXAM:  Patient is calm, in no acute distress.  Dressing clean, dry and intact.  No signs of infection.  Swelling is appropriate.  Ecchymosis is appropriate in amount.  Homans test is negative.  Patient is neurovascularly intact distally.  Moves her toes freely.  DP/TP are 2+.    LABS:  Results from last 7 days   Lab Units 08/12/24  2323   WBC 10*3/mm3 14.68*   HEMOGLOBIN g/dL 12.0   HEMATOCRIT % 36.7   PLATELETS 10*3/mm3 268     Results from last 7 days   Lab Units 08/12/24  2323   SODIUM mmol/L 142   POTASSIUM mmol/L 4.6   CHLORIDE mmol/L 108*   CO2 mmol/L 25.0   BUN mg/dL 39*   CREATININE mg/dL 1.14*   GLUCOSE mg/dL 207*   CALCIUM mg/dL 8.2*     Results from last 7 days   Lab Units 08/11/24  1835   INR  1.05   APTT seconds 27.4*       ASSESSMENT:  Status post left hip intramedullary nail fixation, POD #1    Plan:  Continue Physical Therapy, weightbearing as tolerated to the patient's left lower extremity.  Continue SCDs, Continue DVT prophylaxis.  Dispo planning per primary team and physical therapy recommendations when medically stable.  Patient will likely need skilled nursing facility on discharge " and then transfer back to her long-term care facility.    Family member at bedside.  Communicated this with the patient's family member and all questions answered.    We will sign off from the orthopedic standpoint.  Please call if needed.  Thank you very much for allowing us to be a part of the patient's care.    Patient to follow-up in the office in 2 weeks.    Pbalo Zaragoza PA-C    Date: 8/13/2024  Time: 08:42 EDT

## 2024-08-13 NOTE — CASE MANAGEMENT/SOCIAL WORK
Continued Stay Note   Michael     Patient Name: Viri Hatfield  MRN: [id number]  Today's Date: 8/13/2024    Admit Date: 8/11/2024    Plan: Return to [facility] ( memory)  Will need transportation at dc   Discharge Plan       Row Name 08/13/24 1632       Plan    Plan Return to [facility] ( memory)  [place] need transportation at [place]    Plan Comments DC barriers:  monitoring labs, UA pending .   POD #1 left hip pinning                       Expected Discharge Date and Time       Expected Discharge Date Expected Discharge Time    Aug 15, 2024             Tamiko Agee RN    [facility]  [email or web address]  Office [phone]  Cell [phone]

## 2024-08-14 LAB
ANION GAP SERPL CALCULATED.3IONS-SCNC: 9.7 MMOL/L (ref 5–15)
BASOPHILS # BLD AUTO: 0.04 10*3/MM3 (ref 0–0.2)
BASOPHILS NFR BLD AUTO: 0.4 % (ref 0–1.5)
BUN SERPL-MCNC: 44 MG/DL (ref 8–23)
BUN/CREAT SERPL: 40 (ref 7–25)
CALCIUM SPEC-SCNC: 8.5 MG/DL (ref 8.6–10.5)
CHLORIDE SERPL-SCNC: 112 MMOL/L (ref 98–107)
CO2 SERPL-SCNC: 24.3 MMOL/L (ref 22–29)
CREAT SERPL-MCNC: 1.1 MG/DL (ref 0.57–1)
DEPRECATED RDW RBC AUTO: 50 FL (ref 37–54)
EGFRCR SERPLBLD CKD-EPI 2021: 49 ML/MIN/1.73
EOSINOPHIL # BLD AUTO: 0.1 10*3/MM3 (ref 0–0.4)
EOSINOPHIL NFR BLD AUTO: 1 % (ref 0.3–6.2)
ERYTHROCYTE [DISTWIDTH] IN BLOOD BY AUTOMATED COUNT: 14.5 % (ref 12.3–15.4)
GLUCOSE SERPL-MCNC: 117 MG/DL (ref 65–99)
HCT VFR BLD AUTO: 33.2 % (ref 34–46.6)
HGB BLD-MCNC: 10.5 G/DL (ref 12–15.9)
IMM GRANULOCYTES # BLD AUTO: 0.05 10*3/MM3 (ref 0–0.05)
IMM GRANULOCYTES NFR BLD AUTO: 0.5 % (ref 0–0.5)
LYMPHOCYTES # BLD AUTO: 1.33 10*3/MM3 (ref 0.7–3.1)
LYMPHOCYTES NFR BLD AUTO: 12.8 % (ref 19.6–45.3)
MCH RBC QN AUTO: 30.3 PG (ref 26.6–33)
MCHC RBC AUTO-ENTMCNC: 31.6 G/DL (ref 31.5–35.7)
MCV RBC AUTO: 95.7 FL (ref 79–97)
MONOCYTES # BLD AUTO: 1.12 10*3/MM3 (ref 0.1–0.9)
MONOCYTES NFR BLD AUTO: 10.8 % (ref 5–12)
NEUTROPHILS NFR BLD AUTO: 7.74 10*3/MM3 (ref 1.7–7)
NEUTROPHILS NFR BLD AUTO: 74.5 % (ref 42.7–76)
NRBC BLD AUTO-RTO: 0 /100 WBC (ref 0–0.2)
PLATELET # BLD AUTO: 232 10*3/MM3 (ref 140–450)
PMV BLD AUTO: 10.3 FL (ref 6–12)
POTASSIUM SERPL-SCNC: 4.4 MMOL/L (ref 3.5–5.2)
RBC # BLD AUTO: 3.47 10*6/MM3 (ref 3.77–5.28)
SODIUM SERPL-SCNC: 146 MMOL/L (ref 136–145)
WBC NRBC COR # BLD AUTO: 10.38 10*3/MM3 (ref 3.4–10.8)

## 2024-08-14 PROCEDURE — 80048 BASIC METABOLIC PNL TOTAL CA: CPT | Performed by: ORTHOPAEDIC SURGERY

## 2024-08-14 PROCEDURE — 25810000003 SODIUM CHLORIDE 0.9 % SOLUTION: Performed by: NURSE PRACTITIONER

## 2024-08-14 PROCEDURE — 85025 COMPLETE CBC W/AUTO DIFF WBC: CPT | Performed by: ORTHOPAEDIC SURGERY

## 2024-08-14 PROCEDURE — 97530 THERAPEUTIC ACTIVITIES: CPT

## 2024-08-14 PROCEDURE — 25010000002 CEFTRIAXONE PER 250 MG

## 2024-08-14 RX ADMIN — CEFTRIAXONE 2000 MG: 2 INJECTION, POWDER, FOR SOLUTION INTRAMUSCULAR; INTRAVENOUS at 13:02

## 2024-08-14 RX ADMIN — ASPIRIN 81 MG CHEWABLE TABLET 81 MG: 81 TABLET CHEWABLE at 21:32

## 2024-08-14 RX ADMIN — SODIUM CHLORIDE 75 ML/HR: 9 INJECTION, SOLUTION INTRAVENOUS at 04:35

## 2024-08-14 RX ADMIN — ESCITALOPRAM OXALATE 10 MG: 10 TABLET ORAL at 08:22

## 2024-08-14 RX ADMIN — FAMOTIDINE 20 MG: 20 TABLET, FILM COATED ORAL at 08:22

## 2024-08-14 RX ADMIN — ROPINIROLE HYDROCHLORIDE 0.25 MG: 0.25 TABLET, FILM COATED ORAL at 21:32

## 2024-08-14 RX ADMIN — ASPIRIN 81 MG CHEWABLE TABLET 81 MG: 81 TABLET CHEWABLE at 08:22

## 2024-08-14 RX ADMIN — POLYETHYLENE GLYCOL 3350 17 G: 17 POWDER, FOR SOLUTION ORAL at 08:22

## 2024-08-14 RX ADMIN — Medication 10 ML: at 21:32

## 2024-08-14 RX ADMIN — SODIUM CHLORIDE 75 ML/HR: 9 INJECTION, SOLUTION INTRAVENOUS at 18:57

## 2024-08-14 NOTE — PLAN OF CARE
Goal Outcome Evaluation: Pt is unable to make needs know, dressings clean dry and intact. Pt up to chair tolerated well, assist with feedings, call light within reach, care ongoing

## 2024-08-14 NOTE — THERAPY TREATMENT NOTE
"Subjective: Pt agreeable to therapeutic plan of care.    Objective:   Pt supine in bed, pleasant and cooperative, smiles, says yes and no. Needs a lot of tactile cues and gestures to perform functional tasks.     WB'ing status: L Lower Extremity Weight Bearing As Tolerated    Therapeutic Exercise: pt does not follow commands or cues for exercises.     Precautions: High falls risk      Bed mobility - Mod-A  Transfers - Mod-A and Max-A, stand pivot bed to chair, sit to stand from chair.   Ambulation - 0 feet N/A or Not attempted.    Vitals: WNL, 2L at rest, off during transfer and sats 92%    Pain: 0 VAS, no complaints or visual expressions of pain  Location:   Intervention for pain: N/A    Education: pt unable to participate with education    Assessment: Viri Hatfield presents with functional mobility impairments which indicate the need for skilled intervention. Pt does not appear to have any pain and responds to tactile cues and gestures for functional commands.  Pt requires mod/max A for transferring to EOB and stand pivot to chair.  Pt does bear some wt through LLE and initiated shuffling her feet during transfer to chair. Limited therapy carryover due to dementia. Tolerating session today without incident. Will continue to follow and progress as tolerated.     Plan/Recommendations:   If medically appropriate, Moderate Intensity Therapy recommended post-acute care. This is recommended as therapy feels the patient would require 3-4 days per week and wouldn't tolerate \"3 hour daily\" rehab intensity. SNF would be the preferred choice. If the patient does not agree to SNF, arrange HH or OP depending on home bound status. If patient is medically complex, consider LTACH. Pt requires no DME at discharge.     Pt desires Skilled Rehab placement at discharge. Pt cooperative; agreeable to therapeutic recommendations and plan of care.         Basic Mobility 6-click:  Rollin = Total, A lot = 2, A little = 3; 4 = " None  Supine>Sit:   1 = Total, A lot = 2, A little = 3; 4 = None   Sit>Stand with arms:  1 = Total, A lot = 2, A little = 3; 4 = None  Bed>Chair:   1 = Total, A lot = 2, A little = 3; 4 = None  Ambulate in room:  1 = Total, A lot = 2, A little = 3; 4 = None  3-5 Steps with railin = Total, A lot = 2, A little = 3; 4 = None  Score: 10    Modified [place]: N/A = No pre-op stroke/TIA    Post-Tx Position: Up in Chair, Alarms activated, and Call light and personal items within reach  PPE: gloves and surgical mask

## 2024-08-14 NOTE — PLAN OF CARE
Goal Outcome Evaluation:      Viri Hatfield presents with functional mobility impairments which indicate the need for skilled intervention. Pt does not appear to have any pain and responds to tactile cues and gestures for functional commands.  Pt requires mod/max A for transferring to EOB and stand pivot to chair.  Pt does bear some wt through LLE and initiated shuffling her feet during transfer to chair. Limited therapy carryover due to dementia. Tolerating session today without incident. Will continue to follow and progress as tolerated.

## 2024-08-14 NOTE — CASE MANAGEMENT/SOCIAL WORK
Continued Stay Note  [facility]     Patient Name: Viri Hatfield  MRN: [id number]  Today's Date: 8/14/2024    Admit Date: 8/11/2024    Plan: Return to [facility] (memory). Will need transportation at MD.   Discharge Plan       Row Name 08/14/24 1333       Plan    Plan Return to [facility] (memory). Will need transportation at MD.    Patient/Family in Agreement with Plan yes    Plan Comments SHERRI Ayers provieded CM with updated POA paperwork. CM faxed to HIM stat, fax was sucessful. CM made a copy and put on the hard chart, CM gave back the OG POA paperwork to Armen.      Row Name 08/14/24 1050       Plan    Plan Return to [facility] (memory). Will need transportation at MD.    Patient/Family in Agreement with Plan yes    Plan Comments CM called and spoke with the patient's POA in regards to the patient's IMM form. SHERRI, Armen, v/u and had no futher questions and asked for a copy of the IMM be given at the bedside. Discharge barriers: cont fluids, 2L NC, abnormal labs, electrolyte replacement, cardiac montioring, urine cx pending, febrile, POD 2 hip trochanteric.               Expected Discharge Date and Time       Expected Discharge Date Expected Discharge Time    Aug 15, 2024             Phone communication or documentation only - no physical contact with patient or family.   Denise Hernandez RN

## 2024-08-14 NOTE — CASE MANAGEMENT/SOCIAL WORK
Continued Stay Note  [facility]     Patient Name: Viri Hatfield  MRN: [id number]  Today's Date: 8/14/2024    Admit Date: 8/11/2024    Plan: Return to [facility] (memory). Will need transportation at OR.   Discharge Plan       Row Name 08/14/24 1050       Plan    Plan Return to [facility] (memory). Will need transportation at OR.    Patient/Family in Agreement with Plan yes    Plan Comments CM called and spoke with the patient's POA in regards to the patient's IMM form. POA, Armen, v/u and had no futher questions and asked for a copy of the IMM be given at the bedside. Discharge barriers: cont fluids, 2L NC, abnormal labs, electrolyte replacement, cardiac montioring, urine cx pending, febrile, POD 2 hip trochanteric.                 Expected Discharge Date and Time       Expected Discharge Date Expected Discharge Time    Aug 15, 2024             Phone communication or documentation only - no physical contact with patient or family.   Denise Hernandez RN

## 2024-08-14 NOTE — PROGRESS NOTES
LOS: 2 days   Patient Care Team:  Sharath Henderson MD as PCP - General  Richard Peña MD as Consulting Physician (Cardiology)    Subjective     Sitting up in chair.  Stable overnight.    Review of Systems   Unable to perform ROS: Dementia           Objective     Vital Signs  Temp:  [97.8 °F (36.6 °C)-98.4 °F (36.9 °C)] 98.2 °F (36.8 °C)  Heart Rate:  [89-95] 89  Resp:  [15-18] 15  BP: (106-157)/(58-82) 157/82      Physical Exam  Vitals reviewed.   Constitutional:       Appearance: She is not ill-appearing.   HENT:      Head: Normocephalic and atraumatic.      Right Ear: External ear normal.      Left Ear: External ear normal.      Nose: Nose normal.      Mouth/Throat:      Mouth: Mucous membranes are moist.   Eyes:      General:         Right eye: No discharge.         Left eye: No discharge.   Cardiovascular:      Rate and Rhythm: Normal rate and regular rhythm.      Pulses: Normal pulses.      Heart sounds: Normal heart sounds.   Pulmonary:      Effort: Pulmonary effort is normal.      Breath sounds: Normal breath sounds.   Abdominal:      General: Bowel sounds are normal.      Palpations: Abdomen is soft.   Musculoskeletal:         General: Normal range of motion.      Cervical back: Normal range of motion.   Skin:     General: Skin is warm and dry.   Neurological:      Mental Status: She is alert and oriented to person, place, and time.   Psychiatric:         Behavior: Behavior normal.              Results Review:    Lab Results (last 24 hours)       Procedure Component Value Units Date/Time    Basic Metabolic Panel [[id number]]  (Abnormal) Collected: 08/14/24 0106    Specimen: Blood from Arm, Right Updated: 08/14/24 0138     Glucose 117 mg/dL      BUN 44 mg/dL      Creatinine 1.10 mg/dL      Sodium 146 mmol/L      Potassium 4.4 mmol/L      Comment: Specimen hemolyzed.  Result may be falsely elevated.        Chloride 112 mmol/L      CO2 24.3 mmol/L      Calcium 8.5 mg/dL      BUN/Creatinine Ratio 40.0      Anion Gap 9.7 mmol/L      eGFR 49.0 mL/min/1.73     Narrative:      GFR Normal >60  Chronic Kidney Disease <60  Kidney Failure <15    The GFR formula is only valid for adults with stable renal function between ages 18 and 70.    CBC & Differential [[id number]]  (Abnormal) Collected: 08/14/24 0106    Specimen: Blood from Arm, Right Updated: 08/14/24 0117    Narrative:      The following orders were created for panel order CBC & Differential.  Procedure                               Abnormality         Status                     ---------                               -----------         ------                     CBC Auto Differential[[id number]]        Abnormal            Final result                 Please view results for these tests on the individual orders.    CBC Auto Differential [[id number]]  (Abnormal) Collected: 08/14/24 0106    Specimen: Blood from Arm, Right Updated: 08/14/24 0117     WBC 10.38 10*3/mm3      RBC 3.47 10*6/mm3      Hemoglobin 10.5 g/dL      Hematocrit 33.2 %      MCV 95.7 fL      MCH 30.3 pg      MCHC 31.6 g/dL      RDW 14.5 %      RDW-SD 50.0 fl      MPV 10.3 fL      Platelets 232 10*3/mm3      Neutrophil % 74.5 %      Lymphocyte % 12.8 %      Monocyte % 10.8 %      Eosinophil % 1.0 %      Basophil % 0.4 %      Immature Grans % 0.5 %      Neutrophils, Absolute 7.74 10*3/mm3      Lymphocytes, Absolute 1.33 10*3/mm3      Monocytes, Absolute 1.12 10*3/mm3      Eosinophils, Absolute 0.10 10*3/mm3      Basophils, Absolute 0.04 10*3/mm3      Immature Grans, Absolute 0.05 10*3/mm3      nRBC 0.0 /100 WBC     Urinalysis, Microscopic Only - Straight Cath [[id number]]  (Abnormal) Collected: 08/13/24 1707    Specimen: Urine from Straight Cath Updated: 08/13/24 1752     RBC, UA 11-20 /HPF      WBC, UA 11-20 /HPF      Bacteria, UA 1+ /HPF      Squamous Epithelial Cells, UA 3-6 /HPF      Hyaline Casts, UA 0-2 /LPF      Methodology Manual Light Microscopy    Urine Culture - Urine, Straight Cath [[id number]]  Collected: 08/13/24 1707    Specimen: Urine from Straight Cath Updated: 08/13/24 1752    Urinalysis With Culture If Indicated - Straight Cath [[id number]]  (Abnormal) Collected: 08/13/24 1707    Specimen: Urine from Straight Cath Updated: 08/13/24 1725     Color, UA Yellow     Appearance, UA Cloudy     pH, UA 6.5     Specific Gravity, UA 1.028     Glucose, UA Negative     Ketones, UA Trace     Bilirubin, UA Negative     Blood, UA Moderate (2+)     Protein,  mg/dL (2+)     Leuk Esterase, UA Moderate (2+)     Nitrite, UA Negative     Urobilinogen, UA 1.0 E.U./dL    Narrative:      In absence of clinical symptoms, the presence of pyuria, bacteria, and/or nitrites on the urinalysis result does not correlate with infection.             Imaging Results (Last 24 Hours)       ** No results found for the last 24 hours. **                 I reviewed the patient's new clinical results.    Medication Review:   Scheduled Meds:aspirin, 81 mg, Oral, BID  bisacodyl, 10 mg, Rectal, See Admin Instructions  cefTRIAXone, 2,000 mg, Intravenous, Q24H  escitalopram, 10 mg, Oral, Daily  famotidine, 20 mg, Oral, Daily  polyethylene glycol, 17 g, Oral, Daily  rOPINIRole, 0.25 mg, Oral, Nightly  sodium chloride, 10 mL, Intravenous, Q12H      Continuous Infusions:sodium chloride, 75 mL/hr, Last Rate: 75 mL/hr (08/14/24 5199)      PRN Meds:.  acetaminophen    senna-docusate sodium **AND** polyethylene glycol **AND** bisacodyl **AND** bisacodyl    HYDROcodone-acetaminophen    hyoscyamine    Morphine    ondansetron    sodium chloride    sodium chloride     Interval History:    Assessment & Plan     Closed displaced intertrochanteric fracture of left femur  -s/p surgical intervention  -doing well ortho signed off f/u 2 weeks    UTI, culture pending  - Will start Rocephin and follow culture    Dementia  -pleasantly confused non verbal    Plan for disposition:[facility]    VANESSA Hall  08/14/24  10:29 EDT

## 2024-08-15 VITALS
HEART RATE: 70 BPM | DIASTOLIC BLOOD PRESSURE: 64 MMHG | WEIGHT: 124.12 LBS | TEMPERATURE: 98.3 F | RESPIRATION RATE: 12 BRPM | HEIGHT: 62 IN | OXYGEN SATURATION: 99 % | SYSTOLIC BLOOD PRESSURE: 102 MMHG | BODY MASS INDEX: 22.84 KG/M2

## 2024-08-15 LAB — BACTERIA SPEC AEROBE CULT: NO GROWTH

## 2024-08-15 PROCEDURE — 25810000003 SODIUM CHLORIDE 0.9 % SOLUTION: Performed by: NURSE PRACTITIONER

## 2024-08-15 RX ORDER — CEFDINIR 300 MG/1
300 CAPSULE ORAL EVERY 12 HOURS SCHEDULED
Status: DISCONTINUED | OUTPATIENT
Start: 2024-08-15 | End: 2024-08-15 | Stop reason: HOSPADM

## 2024-08-15 RX ORDER — CEFDINIR 300 MG/1
300 CAPSULE ORAL EVERY 12 HOURS SCHEDULED
Qty: 8 CAPSULE | Refills: 0 | Status: SHIPPED | OUTPATIENT
Start: 2024-08-15 | End: 2024-08-19

## 2024-08-15 RX ORDER — ASPIRIN 81 MG/1
81 TABLET, CHEWABLE ORAL 2 TIMES DAILY
Qty: 23 TABLET | Refills: 0 | Status: SHIPPED | OUTPATIENT
Start: 2024-08-15 | End: 2024-08-27

## 2024-08-15 RX ADMIN — SODIUM CHLORIDE 75 ML/HR: 9 INJECTION, SOLUTION INTRAVENOUS at 08:19

## 2024-08-15 RX ADMIN — Medication 10 ML: at 08:29

## 2024-08-15 RX ADMIN — ASPIRIN 81 MG CHEWABLE TABLET 81 MG: 81 TABLET CHEWABLE at 08:19

## 2024-08-15 RX ADMIN — CEFDINIR 300 MG: 300 CAPSULE ORAL at 11:57

## 2024-08-15 RX ADMIN — POLYETHYLENE GLYCOL 3350 17 G: 17 POWDER, FOR SOLUTION ORAL at 08:19

## 2024-08-15 RX ADMIN — FAMOTIDINE 20 MG: 20 TABLET, FILM COATED ORAL at 08:18

## 2024-08-15 RX ADMIN — ESCITALOPRAM OXALATE 10 MG: 10 TABLET ORAL at 08:19

## 2024-08-15 NOTE — CASE MANAGEMENT/SOCIAL WORK
"Physicians Statement of Medical Necessity for  Ambulance Transportation    GENERAL INFORMATION     Name: Viri Hatfield  YOB: 1940  Medicare #:     [id number]     Transport Date: 08/15/2024 (Valid for round trips this date, or for scheduled repetitive trips for 60 days from the date signed below.)  Origin: [facility]   Destination: [address] [place] [facility]  Is the Patient's stay covered under Medicare Part A (PPS/DRG?)Yes  Closest appropriate facility? Yes  If this a hosp-hosp transfer? No  Is this a hospice patient? No    MEDICAL NECESSITY QUESTIONAIRE    Ambulance Transportation is medically necessary only if other means of transportation are contraindicated or would be potentially harmful to the patient.  To meet this requirement, the patient must be either \"bed confined\" or suffer from a condition such that transport by means other than an ambulance is contraindicated by the patient's condition.  The following questions must be answered by the healthcare professional signing below for this form to be valid:     1) Describe the MEDICAL CONDITION (physical and/or mental) of this patient AT THE TIME OF AMBULANCE TRANSPORT that requires the patient to be transported in an ambulance, and why transport by other means is contraindicated by the patient's condition: UNABLE TO AMBULATE, CONFUSED,  POOR TRUNK CONTROL   Past Medical History:   Diagnosis Date    Dementia     Pacemaker 02/21/2017    AV Eduard Gleason - Dr. Zuñiga     Syncope     x3 Jan. and Feb. 2017      Past Surgical History:   Procedure Laterality Date    BREAST LUMPECTOMY Left     HERNIA REPAIR      HIP TROCHANTERIC NAILING WITH INTRAMEDULLARY HIP SCREW Left 8/12/2024    Procedure: HIP TROCHANTERIC NAILING SHORT WITH INTRAMEDULLARY HIP SCREW;  Surgeon: Tylor Sampson MD;  Location: [facility];  Service: Orthopedics;  Laterality: Left;      2) Is this patient \"bed confined\" as defined " "below?Yes   To be \"bed confined\" the patient must satisfy all three of the following criteria:  (1) unable to get up from bed without assistance; AND (2) unable to ambulate;  AND (3) unable to sit in a chair or wheelchair.  3) Can this patient safely be transported by car or wheelchair van (I.e., may safely sit during transport, without an attendant or monitoring?)No   4. In addition to completing questions 1-3 above, please check any of the following conditions that apply*:          *Note: supporting documentation for any boxes checked must be maintained in the patient's medical records Non-healed fractures, Patient is confused, Requires oxygen - unable to self administer, and Unable to tolerate seated position for time needed to transport      SIGNATURE OF PHYSICIAN OR OTHER AUTHORIZED HEALTHCARE PROFESSIONAL    I certify that the above information is true and correct based on my evaluation of this patient, and represent that the patient requires transport by ambulance and that other forms of transport are contraindicated.  I understand that this information will be used by the Centers for Medicare and Medicaid Services (CMS) to support the determiniation of medical necessity for ambulance services, and I represent that I have personal knowledge of the patient's condition at the time of transport.    BB   If this box is checked, I also certify that the patient is physically or mentally incapable of signing the ambulance service's claim form and that the institution with which I am affiliated has furnished care, services or assistance to the patient.  My signature below is made on behalf of the patient pursuant to 42 .36(b)(4). In accordance with 42 .37, the specific reason(s) that the patient is physically or mentally incapable of signing the claim for is as follows: CONFUSED     Signature of Physician or Healthcare Professional   JOHN MCFADDEN MD/MIROSLAVA SWENSON RN CASE MANAGER  Date/Time:   08/15/2024  " 1100     (For Scheduled repetitive transport, this form is not valid for transports performed more than 60 days after this date).                                                                                                                                            --------------------------------------------------------------------------------------------  Printed Name and Credentials of Physician or Authorized Healthcare Professional     *Form must be signed by patient's attending physician for scheduled, repetitive transports,.  For non-repetitive ambulance transports, if unable to obtain the signature of the attending physician, any of the following may sign (please select below):     Physician  Clinical Nurse Specialist  Registered Nurse     Physician Assistant  Discharge Planner  Licensed Practical Nurse     Nurse Practitioner X

## 2024-08-15 NOTE — SIGNIFICANT NOTE
08/15/24 1158   OTHER   Discipline physical therapist   Rehab Time/Intention   Session Not Performed other (see comments)  (hospital d/c pending)   Recommendation   PT - Next Appointment 08/16/24

## 2024-08-15 NOTE — PLAN OF CARE
"Goal Outcome Evaluation:  Plan of Care Reviewed With: patient        Progress: no change  Outcome Evaluation: pt unable to make needs known, pt on 2L via NC, dressings dry and intact, disoriented x4, pt nonverbal and responds sometimes with \"no\" when asked questions. call light within reach, plan of care ongoing                               "

## 2024-08-15 NOTE — DISCHARGE SUMMARY
Date of Discharge:  8/15/2024    Discharge Diagnosis:   Closed displaced intertrochanteric fracture of left femur   Dementia    Presenting Problem/History of Present Illness  Active Hospital Problems    Diagnosis  POA    **Closed displaced intertrochanteric fracture of left femur [S72.142A]  Yes    Displaced intertrochanteric fracture of left femur, initial encounter for closed fracture [S72.142A]  Unknown    Dementia [F03.90]  Yes      Resolved Hospital Problems   No resolved problems to display.          Hospital Course    Patient is a 84 y.o. female presented with history of dementia who presented to ED from LTC facility with left hip pain. Facility reported that patient slipped out of her wheelchair last night and has not been able to bear weight since then. She did have an xray at the nursing home which did show positive hip fracture. Patient had hip trochanteric nailing short with intramedullary hip screw. She has done well post operatively and will return to LTC facility.     Procedures Performed    Procedure(s):  HIP TROCHANTERIC NAILING SHORT WITH INTRAMEDULLARY HIP SCREW  -------------------       Consults:   Consults       Date and Time Order Name Status Description    8/11/2024  7:01 PM Ortho (on-call MD unless specified) Completed     8/11/2024  7:01 PM Family Medicine Consult              Pertinent Test Results:    Lab Results (most recent)       Procedure Component Value Units Date/Time    Urine Culture - Urine, Straight Cath [[id number]]  (Normal) Collected: 08/13/24 1707    Specimen: Urine from Straight Cath Updated: 08/14/24 1301     Urine Culture No growth    Basic Metabolic Panel [[id number]]  (Abnormal) Collected: 08/14/24 0106    Specimen: Blood from Arm, Right Updated: 08/14/24 0138     Glucose 117 mg/dL      BUN 44 mg/dL      Creatinine 1.10 mg/dL      Sodium 146 mmol/L      Potassium 4.4 mmol/L      Comment: Specimen hemolyzed.  Result may be falsely elevated.        Chloride 112 mmol/L       CO2 24.3 mmol/L      Calcium 8.5 mg/dL      BUN/Creatinine Ratio 40.0     Anion Gap 9.7 mmol/L      eGFR 49.0 mL/min/1.73     Narrative:      GFR Normal >60  Chronic Kidney Disease <60  Kidney Failure <15    The GFR formula is only valid for adults with stable renal function between ages 18 and 70.    CBC & Differential [[id number]]  (Abnormal) Collected: 08/14/24 0106    Specimen: Blood from Arm, Right Updated: 08/14/24 0117    Narrative:      The following orders were created for panel order CBC & Differential.  Procedure                               Abnormality         Status                     ---------                               -----------         ------                     CBC Auto Differential[[id number]]        Abnormal            Final result                 Please view results for these tests on the individual orders.    CBC Auto Differential [[id number]]  (Abnormal) Collected: 08/14/24 0106    Specimen: Blood from Arm, Right Updated: 08/14/24 0117     WBC 10.38 10*3/mm3      RBC 3.47 10*6/mm3      Hemoglobin 10.5 g/dL      Hematocrit 33.2 %      MCV 95.7 fL      MCH 30.3 pg      MCHC 31.6 g/dL      RDW 14.5 %      RDW-SD 50.0 fl      MPV 10.3 fL      Platelets 232 10*3/mm3      Neutrophil % 74.5 %      Lymphocyte % 12.8 %      Monocyte % 10.8 %      Eosinophil % 1.0 %      Basophil % 0.4 %      Immature Grans % 0.5 %      Neutrophils, Absolute 7.74 10*3/mm3      Lymphocytes, Absolute 1.33 10*3/mm3      Monocytes, Absolute 1.12 10*3/mm3      Eosinophils, Absolute 0.10 10*3/mm3      Basophils, Absolute 0.04 10*3/mm3      Immature Grans, Absolute 0.05 10*3/mm3      nRBC 0.0 /100 WBC     Urinalysis, Microscopic Only - Straight Cath [[id number]]  (Abnormal) Collected: 08/13/24 1707    Specimen: Urine from Straight Cath Updated: 08/13/24 1752     RBC, UA 11-20 /HPF      WBC, UA 11-20 /HPF      Bacteria, UA 1+ /HPF      Squamous Epithelial Cells, UA 3-6 /HPF      Hyaline Casts, UA 0-2 /LPF      Methodology  Manual Light Microscopy    Urinalysis With Culture If Indicated - Straight Cath [[id number]]  (Abnormal) Collected: 08/13/24 1707    Specimen: Urine from Straight Cath Updated: 08/13/24 1725     Color, UA Yellow     Appearance, UA Cloudy     pH, UA 6.5     Specific Gravity, UA 1.028     Glucose, UA Negative     Ketones, UA Trace     Bilirubin, UA Negative     Blood, UA Moderate (2+)     Protein,  mg/dL (2+)     Leuk Esterase, UA Moderate (2+)     Nitrite, UA Negative     Urobilinogen, UA 1.0 E.U./dL    Narrative:      In absence of clinical symptoms, the presence of pyuria, bacteria, and/or nitrites on the urinalysis result does not correlate with infection.    Basic Metabolic Panel [[id number]]  (Abnormal) Collected: 08/12/24 2323    Specimen: Blood Updated: 08/12/24 2351     Glucose 207 mg/dL      BUN 39 mg/dL      Creatinine 1.14 mg/dL      Sodium 142 mmol/L      Potassium 4.6 mmol/L      Chloride 108 mmol/L      CO2 25.0 mmol/L      Calcium 8.2 mg/dL      BUN/Creatinine Ratio 34.2     Anion Gap 9.0 mmol/L      eGFR 47.0 mL/min/1.73     Narrative:      GFR Normal >60  Chronic Kidney Disease <60  Kidney Failure <15    The GFR formula is only valid for adults with stable renal function between ages 18 and 70.    CBC & Differential [[id number]]  (Abnormal) Collected: 08/12/24 2323    Specimen: Blood Updated: 08/12/24 2331    Narrative:      The following orders were created for panel order CBC & Differential.  Procedure                               Abnormality         Status                     ---------                               -----------         ------                     CBC Auto Differential[[id number]]        Abnormal            Final result                 Please view results for these tests on the individual orders.    CBC Auto Differential [[id number]]  (Abnormal) Collected: 08/12/24 2323    Specimen: Blood Updated: 08/12/24 2331     WBC 14.68 10*3/mm3      RBC 3.91 10*6/mm3      Hemoglobin 12.0 g/dL       Hematocrit 36.7 %      MCV 93.9 fL      MCH 30.7 pg      MCHC 32.7 g/dL      RDW 14.0 %      RDW-SD 48.3 fl      MPV 10.1 fL      Platelets 268 10*3/mm3      Neutrophil % 85.8 %      Lymphocyte % 4.4 %      Monocyte % 8.9 %      Eosinophil % 0.1 %      Basophil % 0.1 %      Immature Grans % 0.7 %      Neutrophils, Absolute 12.60 10*3/mm3      Lymphocytes, Absolute 0.64 10*3/mm3      Monocytes, Absolute 1.31 10*3/mm3      Eosinophils, Absolute 0.01 10*3/mm3      Basophils, Absolute 0.02 10*3/mm3      Immature Grans, Absolute 0.10 10*3/mm3      nRBC 0.0 /100 WBC     Protime-INR [[id number]]  (Normal) Collected: 08/11/24 1835    Specimen: Blood Updated: 08/11/24 1906     Protime 11.4 Seconds      INR 1.05    aPTT [[id number]]  (Abnormal) Collected: 08/11/24 1835    Specimen: Blood Updated: 08/11/24 1906     PTT 27.4 seconds                     Condition on Discharge:  Stable    Vital Signs  Temp:  [97.1 °F (36.2 °C)-99.1 °F (37.3 °C)] 97.1 °F (36.2 °C)  Heart Rate:  [76-94] 77  Resp:  [17] 17  BP: (100-138)/(56-69) 138/69      Physical Exam  Vitals reviewed.   Constitutional:       Appearance: She is not ill-appearing.   HENT:      Head: Normocephalic and atraumatic.      Right Ear: External ear normal.      Left Ear: External ear normal.      Nose: Nose normal.      Mouth/Throat:      Mouth: Mucous membranes are dry.   Eyes:      General:         Right eye: No discharge.         Left eye: No discharge.   Cardiovascular:      Rate and Rhythm: Normal rate and regular rhythm.      Pulses: Normal pulses.      Heart sounds: Normal heart sounds.   Pulmonary:      Effort: Pulmonary effort is normal.      Breath sounds: Normal breath sounds.   Abdominal:      General: Bowel sounds are normal.      Palpations: Abdomen is soft.   Musculoskeletal:         General: Tenderness present.      Cervical back: Normal range of motion.   Skin:     General: Skin is warm.      Coloration: Skin is pale.   Neurological:      Mental  Status: She is alert. Mental status is at baseline.   Psychiatric:         Cognition and Memory: Cognition is impaired. Memory is impaired.              Discharge Disposition  Skilled Nursing Facility (DC - External)    Discharge Medications     Discharge Medications        New Medications        Instructions Start Date   aspirin 81 MG chewable tablet   81 mg, Oral, 2 Times Daily      cefdinir 300 MG capsule  Commonly known as: OMNICEF   300 mg, Oral, Every 12 Hours Scheduled             Continue These Medications        Instructions Start Date   acetaminophen 325 MG tablet  Commonly known as: TYLENOL   650 mg, Oral, Every 6 Hours PRN      bisacodyl 10 MG suppository  Commonly known as: DULCOLAX   10 mg, Rectal, See Admin Instructions, Once every 3 days prn.      escitalopram 10 MG tablet  Commonly known as: LEXAPRO   10 mg, Oral, Daily      hyoscyamine 0.125 MG tablet  Commonly known as: ANASPAZ,LEVSIN   0.125 mg, Oral, Every 4 Hours PRN      MiraLax 17 GM/SCOOP powder  Generic drug: polyethylene glycol   17 g, Oral, Daily PRN      morphine 20 MG/ML concentrated solution 20mg/ml   10 mg, Oral, Every 4 Hours PRN      rOPINIRole 0.25 MG tablet  Commonly known as: REQUIP   0.25 mg, Oral, Nightly, Take 1 hour before bedtime.      senna 8.6 MG tablet  Commonly known as: SENOKOT   1 tablet, Oral, Every 12 Hours PRN               Discharge Diet:   Diet Instructions       Diet: Regular/House Diet; Regular (IDDSI 7); Thin (IDDSI 0)      Discharge Diet: Regular/House Diet    Texture: Regular (IDDSI 7)    Fluid Consistency: Thin (IDDSI 0)            Activity at Discharge:   Activity Instructions       Gradually Increase Activity Until at Pre-Hospitalization Level              Follow-up Appointments  No future appointments.  Additional Instructions for the Follow-ups that You Need to Schedule       Discharge Follow-up with PCP   As directed       Currently Documented PCP:    Sharath Henderson MD    PCP Phone Number:     [phone]     Follow Up Details: 2 weeks        Discharge Follow-up with Specified Provider: Dr Gerhard Sampson; 2 Weeks   As directed      To: Dr Gerhard Sampson   Follow Up: 2 Weeks                Test Results Pending at Discharge  Pending Labs       Order Current Status    Urine Culture - Urine, Straight Cath Preliminary result             VANESSA Vazquez  08/15/24  10:25 EDT    Time: Discharge 25 min

## 2024-08-15 NOTE — PLAN OF CARE
Goal Outcome Evaluation: Pt is not A&Ox4, unable to make needs known, VSS on 2 L of O2. Pt discharging to [facility] by hospital transportation.

## 2024-08-16 NOTE — CASE MANAGEMENT/SOCIAL WORK
Case Management Discharge Note      Final Note: [facility]    Provided Post Acute Provider List?: N/A  Provided Post Acute Provider Quality & Resource List?: N/A    Selected Continued Care - Discharged on 8/15/2024 Admission date: 8/11/2024 - Discharge disposition: Skilled Nursing Facility (DC - External)      Destination Coordination complete.      Service Provider Selected Services Address Phone Fax Patient Preferred    [facility] [address] [place] [address] [phone] --               Transportation Services  Ambulance: [facility]    Final Discharge Disposition Code: 04 - intermediate care facility

## 2024-12-17 ENCOUNTER — TELEPHONE (OUTPATIENT)
Dept: CARDIOLOGY | Facility: CLINIC | Age: 84
End: 2024-12-17
Payer: MEDICARE

## 2024-12-18 NOTE — TELEPHONE ENCOUNTER
UNABLE TO REACH PT, LAST SEEN IN OFFICE 2018 W/ DR. PRAJAPATI.     PT HAS 5 MONTHS LEFT ON BATTERY, NO VERBAL RELEASE ON FILE TO CALL WITH PERMISSIONS TO SPEAK TO ANYONE ELSE ON HER BEHALF

## 2024-12-19 NOTE — TELEPHONE ENCOUNTER
EDI W/ STACIA AT [facility], MADE APPT FOR DEVICE CHECK, DR. FORREST FOR TUESDAY JANUARY 28 1130.

## 2024-12-19 NOTE — TELEPHONE ENCOUNTER
Okay with February appointment with me and pacemaker interrogation.  Patient appears to have been in the hospital in August 2024 and was seen by [facility] j Dr. Sharath Henderson.  Their office may be able to provide us more information regarding her whereabouts and person to contact etc.

## 2025-01-19 NOTE — PROGRESS NOTES
Encounter Date:01/28/2025      Patient ID: Euvonnaletha Hatfield is a 85 y.o. female.    Chief Complaint:  Status post pacemaker  Significant dementia      History of Present Illness  Patient is not able to provide any history.  Patient has severe dementia.  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    Limited history is available.    Status post dual-chamber pacemaker implantation ([employer])-12/21/2017.-Dr. Zuñiga    Status post syncope prior to pacemaker implantation.    No further details available.    Patient has severe dementia.    Status post left hip surgery.  Status post hernia repair and left breast lumpectomy    - Family history of coronary artery disease    - Non-smoker    - No known allergies    ===============  Plan  ==========  Status post pacemaker  Pacemaker site looks normal.    Interrogation of the pacemaker revealed excellent pacing parameters.  Battery status is 1 year.  Patient has severe dementia.  When she has ROSHAN, discussion would be carried out regarding generator replacement depending on her general condition and progression of dementia.    Follow-up in the office in 6 months with pacemaker interrogation.    Further plan will depend on patient's progress.  ]]]]]]]]]]]]]]]]]]         Diagnosis Plan   1. Presence of cardiac pacemaker        2. Second degree atrioventricular block        3. Syncope, unspecified syncope type        LAB RESULTS (LAST 7 DAYS)    CBC        BMP        CMP         BNP        TROPONIN        CoAg        Creatinine Clearance  CrCl cannot be calculated (Patient's most recent lab result is older than the maximum 30 days allowed.).    ABG        Radiology  No radiology results for the last day                The following portions of the patient's history were reviewed and updated as appropriate: allergies, current medications, past family history, past medical history, past social history, past surgical history, and problem list.    Review of Systems   Constitutional:  Negative for malaise/fatigue.   Cardiovascular:  Negative for chest pain, leg swelling, palpitations and syncope.   Respiratory:  Negative for shortness of breath.    Skin:  Negative for rash.   Gastrointestinal:  Negative for nausea and vomiting.   Neurological:  Negative for dizziness, light-headedness and numbness.   All other systems reviewed and are negative.      Current Outpatient Medications:     acetaminophen (TYLENOL) 325 MG tablet, Take 2 tablets by mouth Every 6 (Six) Hours As Needed for Mild Pain., Disp: , Rfl:     Aspirin Low Dose 81 MG chewable tablet, Chew 1 tablet Daily., Disp: , Rfl:     bisacodyl (DULCOLAX) 10 MG suppository, Insert 1 suppository into the rectum See Admin Instructions. Once every 3 days prn., Disp: , Rfl:     escitalopram (LEXAPRO) 10 MG tablet, Take 1 tablet by mouth Daily., Disp: , Rfl:     hyoscyamine (ANASPAZ,LEVSIN) 0.125 MG tablet, Take 1 tablet by mouth Every 4 (Four) Hours As Needed (secretions)., Disp: , Rfl:     morphine 20 MG/ML concentrated solution 20mg/ml, Take 0.5 mL by mouth Every 4 (Four) Hours As Needed., Disp: , Rfl:     polyethylene glycol (MiraLax) 17 GM/SCOOP powder, Take 17 g by mouth Daily As Needed., Disp: , Rfl:     rOPINIRole (REQUIP) 0.25 MG tablet, Take 1 tablet by mouth Every Night. Take 1 hour before bedtime., Disp: , Rfl:     senna 8.6 MG tablet, Take 1 tablet by mouth Every 12 (Twelve) Hours As Needed for Constipation., Disp: , Rfl:     No Known Allergies    Family History   Problem Relation Age of Onset    Cancer Mother        Past Surgical History:   Procedure Laterality Date    BREAST LUMPECTOMY Left     HERNIA REPAIR      HIP TROCHANTERIC NAILING WITH INTRAMEDULLARY HIP SCREW Left 8/12/2024    Procedure: HIP TROCHANTERIC NAILING SHORT WITH INTRAMEDULLARY HIP SCREW;  Surgeon: Tylor Sampson MD;  Location: [facility] MA[facility] OR;  Service: Orthopedics;  Laterality: Left;       Past Medical History:   Diagnosis Date    Dementia     Pacemaker 02/21/2017  "   [facility] - Dr. Zuñiga     Syncope     x3 Jan. and Feb. 2017       Family History   Problem Relation Age of Onset    Cancer Mother        Social History     Socioeconomic History    Marital status:    Tobacco Use    Smoking status: Never   Vaping Use    Vaping status: Never Used   Substance and Sexual Activity    Alcohol use: Never    Drug use: Never    Sexual activity: Defer         Procedures      Objective:       Physical Exam    /65   Pulse (!) 140   Ht 157.5 cm (62\")   SpO2 (!) 80%   BMI 22.70 kg/m²   The patient sitting in wheelchair.  Not in distress.  Vital signs as noted above.  Severe dementia    Head and neck revealed no carotid bruits or jugular venous distension.  No thyromegaly or lymphadenopathy is present.    Lungs clear.  No wheezing.  Breath sounds are normal bilaterally.    Heart normal first and second heart sounds.  No murmur..  No pericardial rub is present.  No gallop is present.    Abdomen soft and nontender.  No organomegaly is present.    Extremities revealed good peripheral pulses without any pedal edema.    Skin warm and dry.  Pacemaker site looks normal    Musculoskeletal system is grossly normal.    CNS-severe dementia        "

## 2025-01-28 ENCOUNTER — CLINICAL SUPPORT NO REQUIREMENTS (OUTPATIENT)
Dept: CARDIOLOGY | Facility: CLINIC | Age: 85
End: 2025-01-28
Payer: MEDICARE

## 2025-01-28 ENCOUNTER — OFFICE VISIT (OUTPATIENT)
Dept: CARDIOLOGY | Facility: CLINIC | Age: 85
End: 2025-01-28
Payer: MEDICARE

## 2025-01-28 VITALS
BODY MASS INDEX: 22.7 KG/M2 | DIASTOLIC BLOOD PRESSURE: 65 MMHG | OXYGEN SATURATION: 80 % | SYSTOLIC BLOOD PRESSURE: 144 MMHG | HEART RATE: 140 BPM | HEIGHT: 62 IN

## 2025-01-28 DIAGNOSIS — I44.1 SECOND DEGREE ATRIOVENTRICULAR BLOCK: ICD-10-CM

## 2025-01-28 DIAGNOSIS — R55 SYNCOPE, UNSPECIFIED SYNCOPE TYPE: ICD-10-CM

## 2025-01-28 DIAGNOSIS — Z95.0 PRESENCE OF CARDIAC PACEMAKER: Primary | ICD-10-CM

## 2025-01-28 RX ORDER — ASPIRIN 81 MG
81 TABLET,CHEWABLE ORAL DAILY
COMMUNITY
Start: 2025-01-19

## 2025-03-12 ENCOUNTER — APPOINTMENT (OUTPATIENT)
Dept: CT IMAGING | Facility: HOSPITAL | Age: 85
End: 2025-03-12
Payer: MEDICARE

## 2025-03-12 ENCOUNTER — HOSPITAL ENCOUNTER (OUTPATIENT)
Facility: HOSPITAL | Age: 85
Setting detail: OBSERVATION
Discharge: NURSING, REHAB OR LONG-TERM CARE | End: 2025-03-14
Attending: INTERNAL MEDICINE | Admitting: INTERNAL MEDICINE
Payer: MEDICARE

## 2025-03-12 DIAGNOSIS — N39.0 URINARY TRACT INFECTION WITHOUT HEMATURIA, SITE UNSPECIFIED: ICD-10-CM

## 2025-03-12 DIAGNOSIS — D64.9 ANEMIA, UNSPECIFIED TYPE: Primary | ICD-10-CM

## 2025-03-12 LAB
ABO GROUP BLD: NORMAL
ALBUMIN SERPL-MCNC: 4 G/DL (ref 3.5–5.2)
ALBUMIN/GLOB SERPL: 2.4 G/DL
ALP SERPL-CCNC: 107 U/L (ref 39–117)
ALT SERPL W P-5'-P-CCNC: 14 U/L (ref 1–33)
ANION GAP SERPL CALCULATED.3IONS-SCNC: 11.5 MMOL/L (ref 5–15)
ANISOCYTOSIS BLD QL: NORMAL
AST SERPL-CCNC: 15 U/L (ref 1–32)
BACTERIA UR QL AUTO: ABNORMAL /HPF
BASOPHILS # BLD AUTO: 0.07 10*3/MM3 (ref 0–0.2)
BASOPHILS NFR BLD AUTO: 0.9 % (ref 0–1.5)
BILIRUB SERPL-MCNC: 0.2 MG/DL (ref 0–1.2)
BILIRUB UR QL STRIP: NEGATIVE
BLD GP AB SCN SERPL QL: NEGATIVE
BUN SERPL-MCNC: 23 MG/DL (ref 8–23)
BUN/CREAT SERPL: 25.6 (ref 7–25)
CALCIUM SPEC-SCNC: 8.6 MG/DL (ref 8.6–10.5)
CHLORIDE SERPL-SCNC: 108 MMOL/L (ref 98–107)
CLARITY UR: ABNORMAL
CO2 SERPL-SCNC: 21.5 MMOL/L (ref 22–29)
COLOR UR: YELLOW
CREAT SERPL-MCNC: 0.9 MG/DL (ref 0.57–1)
DEPRECATED RDW RBC AUTO: 45.1 FL (ref 37–54)
EGFRCR SERPLBLD CKD-EPI 2021: 62 ML/MIN/1.73
EOSINOPHIL # BLD AUTO: 0.38 10*3/MM3 (ref 0–0.4)
EOSINOPHIL NFR BLD AUTO: 4.8 % (ref 0.3–6.2)
ERYTHROCYTE [DISTWIDTH] IN BLOOD BY AUTOMATED COUNT: 17 % (ref 12.3–15.4)
GLOBULIN UR ELPH-MCNC: 1.7 GM/DL
GLUCOSE SERPL-MCNC: 100 MG/DL (ref 65–99)
GLUCOSE UR STRIP-MCNC: NEGATIVE MG/DL
HCT VFR BLD AUTO: 23 % (ref 34–46.6)
HGB BLD-MCNC: 6.1 G/DL (ref 12–15.9)
HGB UR QL STRIP.AUTO: NEGATIVE
HOLD SPECIMEN: NORMAL
HYALINE CASTS UR QL AUTO: ABNORMAL /LPF
HYPOCHROMIA BLD QL: NORMAL
IMM GRANULOCYTES # BLD AUTO: 0.02 10*3/MM3 (ref 0–0.05)
IMM GRANULOCYTES NFR BLD AUTO: 0.3 % (ref 0–0.5)
IRON 24H UR-MRATE: 12 MCG/DL (ref 37–145)
IRON SATN MFR SERPL: 2 % (ref 20–50)
KETONES UR QL STRIP: NEGATIVE
LEUKOCYTE ESTERASE UR QL STRIP.AUTO: ABNORMAL
LYMPHOCYTES # BLD AUTO: 1.66 10*3/MM3 (ref 0.7–3.1)
LYMPHOCYTES NFR BLD AUTO: 21.1 % (ref 19.6–45.3)
MCH RBC QN AUTO: 19.5 PG (ref 26.6–33)
MCHC RBC AUTO-ENTMCNC: 26.5 G/DL (ref 31.5–35.7)
MCV RBC AUTO: 73.5 FL (ref 79–97)
MICROCYTES BLD QL: NORMAL
MONOCYTES # BLD AUTO: 0.73 10*3/MM3 (ref 0.1–0.9)
MONOCYTES NFR BLD AUTO: 9.3 % (ref 5–12)
NEUTROPHILS NFR BLD AUTO: 4.99 10*3/MM3 (ref 1.7–7)
NEUTROPHILS NFR BLD AUTO: 63.6 % (ref 42.7–76)
NITRITE UR QL STRIP: POSITIVE
NRBC BLD AUTO-RTO: 0 /100 WBC (ref 0–0.2)
PH UR STRIP.AUTO: <=5 [PH] (ref 5–8)
PLATELET # BLD AUTO: 442 10*3/MM3 (ref 140–450)
PMV BLD AUTO: 9.9 FL (ref 6–12)
POTASSIUM SERPL-SCNC: 4.7 MMOL/L (ref 3.5–5.2)
PROT SERPL-MCNC: 5.7 G/DL (ref 6–8.5)
PROT UR QL STRIP: NEGATIVE
RBC # BLD AUTO: 3.13 10*6/MM3 (ref 3.77–5.28)
RBC # UR STRIP: ABNORMAL /HPF
REF LAB TEST METHOD: ABNORMAL
RH BLD: POSITIVE
SMALL PLATELETS BLD QL SMEAR: ADEQUATE
SODIUM SERPL-SCNC: 141 MMOL/L (ref 136–145)
SP GR UR STRIP: 1.02 (ref 1–1.03)
SQUAMOUS #/AREA URNS HPF: ABNORMAL /HPF
T&S EXPIRATION DATE: NORMAL
TIBC SERPL-MCNC: 501 MCG/DL (ref 298–536)
TRANSFERRIN SERPL-MCNC: 336 MG/DL (ref 200–360)
UROBILINOGEN UR QL STRIP: ABNORMAL
WBC # UR STRIP: ABNORMAL /HPF
WBC MORPH BLD: NORMAL
WBC NRBC COR # BLD AUTO: 7.85 10*3/MM3 (ref 3.4–10.8)
WHOLE BLOOD HOLD COAG: NORMAL

## 2025-03-12 PROCEDURE — 86923 COMPATIBILITY TEST ELECTRIC: CPT

## 2025-03-12 PROCEDURE — 85007 BL SMEAR W/DIFF WBC COUNT: CPT

## 2025-03-12 PROCEDURE — P9016 RBC LEUKOCYTES REDUCED: HCPCS

## 2025-03-12 PROCEDURE — P9612 CATHETERIZE FOR URINE SPEC: HCPCS

## 2025-03-12 PROCEDURE — 80053 COMPREHEN METABOLIC PANEL: CPT

## 2025-03-12 PROCEDURE — 81001 URINALYSIS AUTO W/SCOPE: CPT

## 2025-03-12 PROCEDURE — 82746 ASSAY OF FOLIC ACID SERUM: CPT

## 2025-03-12 PROCEDURE — 85025 COMPLETE CBC W/AUTO DIFF WBC: CPT

## 2025-03-12 PROCEDURE — 84466 ASSAY OF TRANSFERRIN: CPT

## 2025-03-12 PROCEDURE — 99285 EMERGENCY DEPT VISIT HI MDM: CPT

## 2025-03-12 PROCEDURE — 86900 BLOOD TYPING SEROLOGIC ABO: CPT

## 2025-03-12 PROCEDURE — 83540 ASSAY OF IRON: CPT

## 2025-03-12 PROCEDURE — 86901 BLOOD TYPING SEROLOGIC RH(D): CPT

## 2025-03-12 PROCEDURE — 36430 TRANSFUSION BLD/BLD COMPNT: CPT

## 2025-03-12 PROCEDURE — 87086 URINE CULTURE/COLONY COUNT: CPT

## 2025-03-12 PROCEDURE — 96365 THER/PROPH/DIAG IV INF INIT: CPT

## 2025-03-12 PROCEDURE — 82607 VITAMIN B-12: CPT

## 2025-03-12 PROCEDURE — 86850 RBC ANTIBODY SCREEN: CPT

## 2025-03-12 PROCEDURE — 25010000002 CEFTRIAXONE PER 250 MG

## 2025-03-12 PROCEDURE — 70450 CT HEAD/BRAIN W/O DYE: CPT

## 2025-03-12 RX ORDER — SODIUM CHLORIDE 0.9 % (FLUSH) 0.9 %
10 SYRINGE (ML) INJECTION AS NEEDED
Status: DISCONTINUED | OUTPATIENT
Start: 2025-03-12 | End: 2025-03-14 | Stop reason: HOSPADM

## 2025-03-12 RX ORDER — ESCITALOPRAM OXALATE 10 MG/1
10 TABLET ORAL DAILY
Status: DISCONTINUED | OUTPATIENT
Start: 2025-03-13 | End: 2025-03-14 | Stop reason: HOSPADM

## 2025-03-12 RX ORDER — ROPINIROLE 0.25 MG/1
0.25 TABLET, FILM COATED ORAL NIGHTLY
Status: DISCONTINUED | OUTPATIENT
Start: 2025-03-13 | End: 2025-03-14 | Stop reason: HOSPADM

## 2025-03-12 RX ADMIN — CEFTRIAXONE SODIUM 1000 MG: 1 INJECTION, POWDER, FOR SOLUTION INTRAMUSCULAR; INTRAVENOUS at 21:18

## 2025-03-12 NOTE — LETTER
EMS Transport Request  For use at [facility], [place], [place], [place], and [place] only   Patient Name: Viri Hatfield : 1940   Weight:59.3 kg (130 lb 11.7 oz) Pick-up Location: 2114 BLS/ALS: BLS/ALS: BLS   Insurance: MEDICARE Auth End Date: n/a   Pre-Cert #: D/C Summary complete: yes   Destination: Other [address]   Contact Precautions: Other Contact-Pavithra Auris rule out   Equipment (O2, Fluids, etc.): None   Arrive By Date/Time: 3/14/25 ready Stretcher/WC: Stretcher   CM Requesting: Celsa Cooley Ext: [phone]   Notes/Medical Necessity: confusion, max assist for bed mobility, on room air, 130lbs, unable to tolerate seated position for transport     ______________________________________________________________________    *Only 2 patient bags OR 1 carry-on size bag are permitted.  Wheelchairs and walkers CANNOT transported with the patient. Acknowledge: Yes
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
## 2025-03-13 LAB
ANION GAP SERPL CALCULATED.3IONS-SCNC: 10.5 MMOL/L (ref 5–15)
BACTERIA SPEC AEROBE CULT: NORMAL
BASOPHILS # BLD AUTO: 0.07 10*3/MM3 (ref 0–0.2)
BASOPHILS NFR BLD AUTO: 1.1 % (ref 0–1.5)
BH BB BLOOD EXPIRATION DATE: NORMAL
BH BB BLOOD TYPE BARCODE: 7300
BH BB DISPENSE STATUS: NORMAL
BH BB PRODUCT CODE: NORMAL
BH BB UNIT NUMBER: NORMAL
BUN SERPL-MCNC: 19 MG/DL (ref 8–23)
BUN/CREAT SERPL: 22.4 (ref 7–25)
CALCIUM SPEC-SCNC: 8.3 MG/DL (ref 8.6–10.5)
CHLORIDE SERPL-SCNC: 108 MMOL/L (ref 98–107)
CO2 SERPL-SCNC: 20.5 MMOL/L (ref 22–29)
CREAT SERPL-MCNC: 0.85 MG/DL (ref 0.57–1)
CROSSMATCH INTERPRETATION: NORMAL
DEPRECATED RDW RBC AUTO: 50.1 FL (ref 37–54)
EGFRCR SERPLBLD CKD-EPI 2021: 66.4 ML/MIN/1.73
EOSINOPHIL # BLD AUTO: 0.28 10*3/MM3 (ref 0–0.4)
EOSINOPHIL NFR BLD AUTO: 4.2 % (ref 0.3–6.2)
ERYTHROCYTE [DISTWIDTH] IN BLOOD BY AUTOMATED COUNT: 18.4 % (ref 12.3–15.4)
FERRITIN SERPL-MCNC: 6.71 NG/ML (ref 13–150)
FOLATE SERPL-MCNC: 12.9 NG/ML (ref 4.78–24.2)
GLUCOSE SERPL-MCNC: 112 MG/DL (ref 65–99)
HCT VFR BLD AUTO: 25.3 % (ref 34–46.6)
HGB BLD-MCNC: 7.1 G/DL (ref 12–15.9)
IMM GRANULOCYTES # BLD AUTO: 0.03 10*3/MM3 (ref 0–0.05)
IMM GRANULOCYTES NFR BLD AUTO: 0.5 % (ref 0–0.5)
LYMPHOCYTES # BLD AUTO: 1.53 10*3/MM3 (ref 0.7–3.1)
LYMPHOCYTES NFR BLD AUTO: 23.2 % (ref 19.6–45.3)
MCH RBC QN AUTO: 21.1 PG (ref 26.6–33)
MCHC RBC AUTO-ENTMCNC: 28.1 G/DL (ref 31.5–35.7)
MCV RBC AUTO: 75.3 FL (ref 79–97)
MONOCYTES # BLD AUTO: 0.6 10*3/MM3 (ref 0.1–0.9)
MONOCYTES NFR BLD AUTO: 9.1 % (ref 5–12)
NEUTROPHILS NFR BLD AUTO: 4.09 10*3/MM3 (ref 1.7–7)
NEUTROPHILS NFR BLD AUTO: 61.9 % (ref 42.7–76)
NRBC BLD AUTO-RTO: 0 /100 WBC (ref 0–0.2)
PLATELET # BLD AUTO: 364 10*3/MM3 (ref 140–450)
PMV BLD AUTO: 9.3 FL (ref 6–12)
POTASSIUM SERPL-SCNC: 4.2 MMOL/L (ref 3.5–5.2)
RBC # BLD AUTO: 3.36 10*6/MM3 (ref 3.77–5.28)
SODIUM SERPL-SCNC: 139 MMOL/L (ref 136–145)
UNIT  ABO: NORMAL
UNIT  RH: NORMAL
VIT B12 BLD-MCNC: 437 PG/ML (ref 211–946)
WBC NRBC COR # BLD AUTO: 6.6 10*3/MM3 (ref 3.4–10.8)

## 2025-03-13 PROCEDURE — 96375 TX/PRO/DX INJ NEW DRUG ADDON: CPT

## 2025-03-13 PROCEDURE — 25810000003 SODIUM CHLORIDE 0.9 % SOLUTION: Performed by: INTERNAL MEDICINE

## 2025-03-13 PROCEDURE — 25810000003 SODIUM CHLORIDE 0.9 % SOLUTION

## 2025-03-13 PROCEDURE — G0378 HOSPITAL OBSERVATION PER HR: HCPCS

## 2025-03-13 PROCEDURE — 80048 BASIC METABOLIC PNL TOTAL CA: CPT

## 2025-03-13 PROCEDURE — 82728 ASSAY OF FERRITIN: CPT

## 2025-03-13 PROCEDURE — 87481 CANDIDA DNA AMP PROBE: CPT | Performed by: INTERNAL MEDICINE

## 2025-03-13 PROCEDURE — 96366 THER/PROPH/DIAG IV INF ADDON: CPT

## 2025-03-13 PROCEDURE — 96367 TX/PROPH/DG ADDL SEQ IV INF: CPT

## 2025-03-13 PROCEDURE — 25010000002 NA FERRIC GLUC CPLX PER 12.5 MG

## 2025-03-13 PROCEDURE — 25010000002 NA FERRIC GLUC CPLX PER 12.5 MG: Performed by: INTERNAL MEDICINE

## 2025-03-13 PROCEDURE — 85025 COMPLETE CBC W/AUTO DIFF WBC: CPT

## 2025-03-13 RX ORDER — ACETAMINOPHEN 325 MG/1
650 TABLET ORAL EVERY 4 HOURS PRN
Status: DISCONTINUED | OUTPATIENT
Start: 2025-03-13 | End: 2025-03-14 | Stop reason: HOSPADM

## 2025-03-13 RX ORDER — AMOXICILLIN 250 MG
2 CAPSULE ORAL 2 TIMES DAILY PRN
Status: DISCONTINUED | OUTPATIENT
Start: 2025-03-13 | End: 2025-03-14 | Stop reason: HOSPADM

## 2025-03-13 RX ORDER — SODIUM CHLORIDE 0.9 % (FLUSH) 0.9 %
10 SYRINGE (ML) INJECTION AS NEEDED
Status: DISCONTINUED | OUTPATIENT
Start: 2025-03-13 | End: 2025-03-14 | Stop reason: HOSPADM

## 2025-03-13 RX ORDER — SODIUM CHLORIDE 9 MG/ML
50 INJECTION, SOLUTION INTRAVENOUS CONTINUOUS
Status: DISCONTINUED | OUTPATIENT
Start: 2025-03-13 | End: 2025-03-13

## 2025-03-13 RX ORDER — ONDANSETRON 4 MG/1
4 TABLET, ORALLY DISINTEGRATING ORAL EVERY 6 HOURS PRN
Status: DISCONTINUED | OUTPATIENT
Start: 2025-03-13 | End: 2025-03-14 | Stop reason: HOSPADM

## 2025-03-13 RX ORDER — BISACODYL 10 MG
10 SUPPOSITORY, RECTAL RECTAL DAILY PRN
Status: DISCONTINUED | OUTPATIENT
Start: 2025-03-13 | End: 2025-03-14 | Stop reason: HOSPADM

## 2025-03-13 RX ORDER — SODIUM CHLORIDE 0.9 % (FLUSH) 0.9 %
10 SYRINGE (ML) INJECTION EVERY 12 HOURS SCHEDULED
Status: DISCONTINUED | OUTPATIENT
Start: 2025-03-13 | End: 2025-03-14 | Stop reason: HOSPADM

## 2025-03-13 RX ORDER — NITROGLYCERIN 0.4 MG/1
0.4 TABLET SUBLINGUAL
Status: DISCONTINUED | OUTPATIENT
Start: 2025-03-13 | End: 2025-03-14 | Stop reason: HOSPADM

## 2025-03-13 RX ORDER — ONDANSETRON 2 MG/ML
4 INJECTION INTRAMUSCULAR; INTRAVENOUS EVERY 6 HOURS PRN
Status: DISCONTINUED | OUTPATIENT
Start: 2025-03-13 | End: 2025-03-14 | Stop reason: HOSPADM

## 2025-03-13 RX ORDER — PANTOPRAZOLE SODIUM 40 MG/1
40 TABLET, DELAYED RELEASE ORAL
Status: DISCONTINUED | OUTPATIENT
Start: 2025-03-13 | End: 2025-03-14 | Stop reason: HOSPADM

## 2025-03-13 RX ORDER — BISACODYL 5 MG/1
5 TABLET, DELAYED RELEASE ORAL DAILY PRN
Status: DISCONTINUED | OUTPATIENT
Start: 2025-03-13 | End: 2025-03-14 | Stop reason: HOSPADM

## 2025-03-13 RX ORDER — SODIUM CHLORIDE 9 MG/ML
40 INJECTION, SOLUTION INTRAVENOUS AS NEEDED
Status: DISCONTINUED | OUTPATIENT
Start: 2025-03-13 | End: 2025-03-14 | Stop reason: HOSPADM

## 2025-03-13 RX ORDER — POLYETHYLENE GLYCOL 3350 17 G/17G
17 POWDER, FOR SOLUTION ORAL DAILY PRN
Status: DISCONTINUED | OUTPATIENT
Start: 2025-03-13 | End: 2025-03-14 | Stop reason: HOSPADM

## 2025-03-13 RX ORDER — PANTOPRAZOLE SODIUM 40 MG/10ML
40 INJECTION, POWDER, LYOPHILIZED, FOR SOLUTION INTRAVENOUS
Status: DISCONTINUED | OUTPATIENT
Start: 2025-03-13 | End: 2025-03-13

## 2025-03-13 RX ADMIN — SODIUM CHLORIDE 250 MG: 9 INJECTION, SOLUTION INTRAVENOUS at 13:12

## 2025-03-13 RX ADMIN — Medication 10 ML: at 20:52

## 2025-03-13 RX ADMIN — Medication 10 ML: at 09:14

## 2025-03-13 RX ADMIN — PANTOPRAZOLE SODIUM 40 MG: 40 INJECTION, POWDER, FOR SOLUTION INTRAVENOUS at 05:41

## 2025-03-13 RX ADMIN — PANTOPRAZOLE SODIUM 40 MG: 40 TABLET, DELAYED RELEASE ORAL at 13:12

## 2025-03-13 RX ADMIN — ROPINIROLE HYDROCHLORIDE 0.25 MG: 0.25 TABLET, FILM COATED ORAL at 20:52

## 2025-03-13 RX ADMIN — SODIUM CHLORIDE 50 ML/HR: 9 INJECTION, SOLUTION INTRAVENOUS at 00:37

## 2025-03-13 RX ADMIN — ESCITALOPRAM 10 MG: 10 TABLET, FILM COATED ORAL at 09:13

## 2025-03-13 RX ADMIN — Medication 10 ML: at 00:36

## 2025-03-13 RX ADMIN — ROPINIROLE HYDROCHLORIDE 0.25 MG: 0.25 TABLET, FILM COATED ORAL at 00:37

## 2025-03-13 NOTE — ED NOTES
Nursing report ED to floor  Viri Hatfield  85 y.o.  female    HPI:   Chief Complaint   Patient presents with    Abnormal Lab       Admitting doctor:   Rosalva Condon MD    Admitting diagnosis:   The primary encounter diagnosis was Anemia, unspecified type. Diagnoses of Abnormal labor and Urinary tract infection without hematuria, site unspecified were also pertinent to this visit.    Code status:   Current Code Status       Date Active Code Status Order ID Comments User Context       Prior            Allergies:   Patient has no known allergies.    Isolation:  No active isolations     Fall Risk:  Fall Risk Assessment was completed, and patient is at high risk for falls.   Predictive Model Details         13 (Low) Factor Value    Calculated 3/12/2025 22:40 Age 85    Risk of Fall Model Active Peripheral IV Present     Imaging order in this encounter Present     Respiratory Rate 18     Magnesium not on file     Calcium 8.6 mg/dL     Diastolic BP 71     Sumit Scale not on file     Number of Distinct Medication Classes administered 2     Chloride 108 mmol/L     Albumin 4 g/dL     Duration of Current Encounter 0.244 days     Potassium 4.7 mmol/L     Creatinine 0.9 mg/dL     Total Bilirubin 0.2 mg/dL     ALT 14 U/L         Weight:   There were no vitals filed for this visit.    Intake and Output    Intake/Output Summary (Last 24 hours) at 3/12/2025 2243  Last data filed at 3/12/2025 2205  Gross per 24 hour   Intake 100 ml   Output --   Net 100 ml       Diet:        Most recent vitals:   Vitals:    03/12/25 2204 03/12/25 2227 03/12/25 2230 03/12/25 2235   BP: 118/50 127/61 126/63 111/71   Pulse: 72 70 73 68   Resp: 18 16 16 18   Temp: 97.7 °F (36.5 °C)   97.7 °F (36.5 °C)   TempSrc:    Oral   SpO2: 93% 95% 94% 95%       Active LDAs/IV Access:   Lines, Drains & Airways       Active LDAs       Name Placement date Placement time Site Days    Peripheral IV 03/12/25 1740 Anterior;Left Forearm 03/12/25  1740  Forearm  less than 1     External Urinary Catheter 08/13/24  0040  --  211                    Skin Condition:   Skin Assessments (last day)       None             Labs (abnormal labs have a star):   Labs Reviewed   COMPREHENSIVE METABOLIC PANEL - Abnormal; Notable for the following components:       Result Value    Glucose 100 (*)     Chloride 108 (*)     CO2 21.5 (*)     Total Protein 5.7 (*)     BUN/Creatinine Ratio 25.6 (*)     All other components within normal limits    Narrative:     GFR Categories in Chronic Kidney Disease (CKD)      GFR Category          GFR (mL/min/1.73)    Interpretation  G1                     90 or greater         Normal or high (1)  G2                      60-89                Mild decrease (1)  G3a                   45-59                Mild to moderate decrease  G3b                   30-44                Moderate to severe decrease  G4                    15-29                Severe decrease  G5                    14 or less           Kidney failure          (1)In the absence of evidence of kidney disease, neither GFR category G1 or G2 fulfill the criteria for CKD.    eGFR calculation 2021 CKD-EPI creatinine equation, which does not include race as a factor   URINALYSIS W/ CULTURE IF INDICATED - Abnormal; Notable for the following components:    Appearance, UA Slightly Cloudy (*)     Leuk Esterase, UA Small (1+) (*)     Nitrite, UA Positive (*)     All other components within normal limits    Narrative:     In absence of clinical symptoms, the presence of pyuria, bacteria, and/or nitrites on the urinalysis result does not correlate with infection.   CBC WITH AUTO DIFFERENTIAL - Abnormal; Notable for the following components:    RBC 3.13 (*)     Hemoglobin 6.1 (*)     Hematocrit 23.0 (*)     MCV 73.5 (*)     MCH 19.5 (*)     MCHC 26.5 (*)     RDW 17.0 (*)     All other components within normal limits    Narrative:     Appended report. These results have been appended to a previously verified report.    URINALYSIS, MICROSCOPIC ONLY - Abnormal; Notable for the following components:    WBC, UA 11-20 (*)     Bacteria, UA 4+ (*)     Squamous Epithelial Cells, UA 7-12 (*)     All other components within normal limits   URINE CULTURE   SCAN SLIDE   IRON PROFILE   VITAMIN B12   FOLATE   OCCULT BLOOD, FECAL BY IMMUNOASSAY   OCCULT BLOOD, FECAL BY IMMUNOASSAY   TYPE AND SCREEN   PREPARE RBC   CBC AND DIFFERENTIAL    Narrative:     The following orders were created for panel order CBC & Differential.  Procedure                               Abnormality         Status                     ---------                               -----------         ------                     CBC Auto Differential[[id number]]        Abnormal            Final result               Scan Slide[[id number]]                                       Final result                 Please view results for these tests on the individual orders.   EXTRA TUBES    Narrative:     The following orders were created for panel order Extra Tubes.  Procedure                               Abnormality         Status                     ---------                               -----------         ------                     Gold Top - SST[[id number]]                                   Final result               Light Blue Top[[id number]]                                   Final result                 Please view results for these tests on the individual orders.   GOLD TOP - SST   LIGHT BLUE TOP       LOC: Person    Telemetry:  Telemetry    Cardiac Monitoring Ordered: yes    EKG:   No orders to display       Medications Given in the ED:   Medications   sodium chloride 0.9 % flush 10 mL (has no administration in time range)   cefTRIAXone (ROCEPHIN) 1,000 mg in sodium chloride 0.9 % 100 mL MBP (0 mg Intravenous Stopped 3/12/25 2205)       Imaging results:  CT Head Without Contrast  Result Date: 3/12/2025  Impression: No acute intracranial findings. Electronically Signed: Roland YA  Lalita  3/12/2025 6:40 PM EDT  Workstation ID: [id number]      Social issues:   Social History     Socioeconomic History    Marital status:    Tobacco Use    Smoking status: Never   Vaping Use    Vaping status: Never Used   Substance and Sexual Activity    Alcohol use: Never    Drug use: Never    Sexual activity: Defer       NIH Stroke Scale:  Interval: (not recorded)  1a. Level of Consciousness: (not recorded)  1b. LOC Questions: (not recorded)  1c. LOC Commands: (not recorded)  2. Best Gaze: (not recorded)  3. Visual: (not recorded)  4. Facial Palsy: (not recorded)  5a. Motor Arm, Left: (not recorded)  5b. Motor Arm, Right: (not recorded)  6a. Motor Leg, Left: (not recorded)  6b. Motor Leg, Right: (not recorded)  7. Limb Ataxia: (not recorded)  8. Sensory: (not recorded)  9. Best Language: (not recorded)  10. Dysarthria: (not recorded)  11. Extinction and Inattention (formerly Neglect): (not recorded)    Total (NIH Stroke Scale): (not recorded)     Additional notable assessment information:     Nursing report ED to floor:  PCU    Garret Boo RN   03/12/25 22:43 EDT

## 2025-03-13 NOTE — ED PROVIDER NOTES
Subjective     Chief Complaint   Patient presents with    Abnormal Lab     History of Present Illness  Chief complaint: Abnormal labs      Context: Patient is 85-year-old female who presents to the emergency department via EMS from [facility].  Facility states that she may have had a fall, they are unsure, and they state that she has abnormal labs with a hemoglobin of 5.5.  Patient is alert and oriented x 0 as baseline and unable to communicate any other complaints during assessment.        PCP: Sharath Henderson MD                   Review of Systems    Past Medical History:   Diagnosis Date    Dementia     Pacemaker 02/21/2017    AV Eduard Gleason - Dr. Zuñiga     Syncope     x3 Jan. and Feb. 2017       No Known Allergies    Past Surgical History:   Procedure Laterality Date    BREAST LUMPECTOMY Left     HERNIA REPAIR      HIP TROCHANTERIC NAILING WITH INTRAMEDULLARY HIP SCREW Left 8/12/2024    Procedure: HIP TROCHANTERIC NAILING SHORT WITH INTRAMEDULLARY HIP SCREW;  Surgeon: Tylor Sampson MD;  Location: [facility] MAIN OR;  Service: Orthopedics;  Laterality: Left;       Family History   Problem Relation Age of Onset    Cancer Mother        Social History     Socioeconomic History    Marital status:    Tobacco Use    Smoking status: Never   Vaping Use    Vaping status: Never Used   Substance and Sexual Activity    Alcohol use: Never    Drug use: Never    Sexual activity: Defer           Objective   Physical Exam  Constitutional:       General: She is not in acute distress.     Appearance: She is ill-appearing. She is not toxic-appearing.   HENT:      Head: Normocephalic and atraumatic.      Nose: No congestion or rhinorrhea.      Mouth/Throat:      Mouth: Mucous membranes are moist. Mucous membranes are dry.   Eyes:      Pupils: Pupils are equal, round, and reactive to light.   Cardiovascular:      Rate and Rhythm: Normal rate and regular rhythm.      Pulses: Normal pulses.      Heart sounds: Normal  "heart sounds.   Pulmonary:      Effort: Pulmonary effort is normal.      Breath sounds: Normal breath sounds.   Abdominal:      General: Abdomen is flat. Bowel sounds are normal.   Musculoskeletal:      Right lower leg: No edema.      Left lower leg: No edema.   Skin:     General: Skin is warm and dry.      Capillary Refill: Capillary refill takes less than 2 seconds.      Coloration: Skin is not jaundiced.   Neurological:      Mental Status: Mental status is at baseline. She is disoriented.      GCS: GCS eye subscore is 4. GCS verbal subscore is 4. GCS motor subscore is 4.         Procedures           ED Course  ED Course as of 03/14/25 0930   Wed Mar 12, 2025   1754 Labs sent; waiting for results [RS]   1757 Hgb 6.1; type and screen ordered now [RS]   1926 PRBCs ordered now that type and screen is resulted [RS]   2106 Received report from ALESSANDRA Eldridge. [LT]   2108 Consulted [facility]. [LT]   2112 Spoke with Sabrina, GERARDO with [facility] who agreed to admit patient and assume care. [LT]      ED Course User Index  [LT] Ritu Johnson APRN  [RS] Shay Gtz PA-C      /65 (BP Location: Right arm, Patient Position: Lying) Comment: RN AWARE  Pulse 71   Temp 98.3 °F (36.8 °C) (Axillary)   Resp 15   Ht 162.6 cm (64\")   Wt 59.3 kg (130 lb 11.7 oz)   SpO2 95%   BMI 22.44 kg/m²   Labs Reviewed   COMPREHENSIVE METABOLIC PANEL - Abnormal; Notable for the following components:       Result Value    Glucose 100 (*)     Chloride 108 (*)     CO2 21.5 (*)     Total Protein 5.7 (*)     BUN/Creatinine Ratio 25.6 (*)     All other components within normal limits    Narrative:     GFR Categories in Chronic Kidney Disease (CKD)      GFR Category          GFR (mL/min/1.73)    Interpretation  G1                     90 or greater         Normal or high (1)  G2                      60-89                Mild decrease (1)  G3a                   45-59                Mild to moderate decrease  G3b                   30-44             "    Moderate to severe decrease  G4                    15-29                Severe decrease  G5                    14 or less           Kidney failure          (1)In the absence of evidence of kidney disease, neither GFR category G1 or G2 fulfill the criteria for CKD.    eGFR calculation 2021 CKD-EPI creatinine equation, which does not include race as a factor   URINALYSIS W/ CULTURE IF INDICATED - Abnormal; Notable for the following components:    Appearance, UA Slightly Cloudy (*)     Leuk Esterase, UA Small (1+) (*)     Nitrite, UA Positive (*)     All other components within normal limits    Narrative:     In absence of clinical symptoms, the presence of pyuria, bacteria, and/or nitrites on the urinalysis result does not correlate with infection.   CBC WITH AUTO DIFFERENTIAL - Abnormal; Notable for the following components:    RBC 3.13 (*)     Hemoglobin 6.1 (*)     Hematocrit 23.0 (*)     MCV 73.5 (*)     MCH 19.5 (*)     MCHC 26.5 (*)     RDW 17.0 (*)     All other components within normal limits    Narrative:     Appended report. These results have been appended to a previously verified report.   URINALYSIS, MICROSCOPIC ONLY - Abnormal; Notable for the following components:    WBC, UA 11-20 (*)     Bacteria, UA 4+ (*)     Squamous Epithelial Cells, UA 7-12 (*)     All other components within normal limits   IRON PROFILE - Abnormal; Notable for the following components:    Iron 12 (*)     Iron Saturation (TSAT) 2 (*)     All other components within normal limits   BASIC METABOLIC PANEL - Abnormal; Notable for the following components:    Glucose 112 (*)     Chloride 108 (*)     CO2 20.5 (*)     Calcium 8.3 (*)     All other components within normal limits    Narrative:     GFR Categories in Chronic Kidney Disease (CKD)      GFR Category          GFR (mL/min/1.73)    Interpretation  G1                     90 or greater         Normal or high (1)  G2                      60-89                Mild decrease  (1)  G3a                   45-59                Mild to moderate decrease  G3b                   30-44                Moderate to severe decrease  G4                    15-29                Severe decrease  G5                    14 or less           Kidney failure          (1)In the absence of evidence of kidney disease, neither GFR category G1 or G2 fulfill the criteria for CKD.    eGFR calculation 2021 CKD-EPI creatinine equation, which does not include race as a factor   CBC WITH AUTO DIFFERENTIAL - Abnormal; Notable for the following components:    RBC 3.36 (*)     Hemoglobin 7.1 (*)     Hematocrit 25.3 (*)     MCV 75.3 (*)     MCH 21.1 (*)     MCHC 28.1 (*)     RDW 18.4 (*)     All other components within normal limits   FERRITIN - Abnormal; Notable for the following components:    Ferritin 6.71 (*)     All other components within normal limits    Narrative:     Results may be falsely decreased if patient taking Biotin.     BASIC METABOLIC PANEL - Abnormal; Notable for the following components:    Glucose 105 (*)     Creatinine 1.10 (*)     Chloride 111 (*)     eGFR 48.7 (*)     All other components within normal limits    Narrative:     GFR Categories in Chronic Kidney Disease (CKD)      GFR Category          GFR (mL/min/1.73)    Interpretation  G1                     90 or greater         Normal or high (1)  G2                      60-89                Mild decrease (1)  G3a                   45-59                Mild to moderate decrease  G3b                   30-44                Moderate to severe decrease  G4                    15-29                Severe decrease  G5                    14 or less           Kidney failure          (1)In the absence of evidence of kidney disease, neither GFR category G1 or G2 fulfill the criteria for CKD.    eGFR calculation 2021 CKD-EPI creatinine equation, which does not include race as a factor   CBC WITH AUTO DIFFERENTIAL - Abnormal; Notable for the following  components:    RBC 3.45 (*)     Hemoglobin 7.2 (*)     Hematocrit 25.9 (*)     MCV 75.1 (*)     MCH 20.9 (*)     MCHC 27.8 (*)     RDW 18.4 (*)     All other components within normal limits   VITAMIN B12 - Normal    Narrative:     Results may be falsely increased if patient taking Biotin.     FOLATE - Normal    Narrative:     Results may be falsely increased if patient taking Biotin.     OCCULT BLOOD, FECAL BY IMMUNOASSAY - Normal   URINE CULTURE    Narrative:     Specimen contains mixed organisms of questionable pathogenicity suggestive of contamination. If symptoms persist, suggest recollection.  Colonization of the urinary tract without infection is common. Treatment is discouraged unless the patient is symptomatic, pregnant, or undergoing an invasive urologic procedure.   CANDIDA AURIS PCR   SCAN SLIDE   OCCULT BLOOD, FECAL BY IMMUNOASSAY   OCCULT BLOOD, FECAL BY IMMUNOASSAY   TYPE AND SCREEN   PREPARE RBC   CBC AND DIFFERENTIAL    Narrative:     The following orders were created for panel order CBC & Differential.  Procedure                               Abnormality         Status                     ---------                               -----------         ------                     CBC Auto Differential[[id number]]        Abnormal            Final result               Scan Slide[[id number]]                                       Final result                 Please view results for these tests on the individual orders.   EXTRA TUBES    Narrative:     The following orders were created for panel order Extra Tubes.  Procedure                               Abnormality         Status                     ---------                               -----------         ------                     Gold Top - SST[[id number]]                                   Final result               Light Blue Top[[id number]]                                   Final result                 Please view results for these tests on the individual  orders.   GOLD TOP - [facility]   LIGHT BLUE TOP   CBC AND DIFFERENTIAL    Narrative:     The following orders were created for panel order CBC & Differential.  Procedure                               Abnormality         Status                     ---------                               -----------         ------                     CBC Auto Differential[[id number]]        Abnormal            Final result                 Please view results for these tests on the individual orders.   CBC AND DIFFERENTIAL    Narrative:     The following orders were created for panel order CBC & Differential.  Procedure                               Abnormality         Status                     ---------                               -----------         ------                     CBC Auto Differential[[id number]]        Abnormal            Final result                 Please view results for these tests on the individual orders.     Medications   sodium chloride 0.9 % flush 10 mL (has no administration in time range)   escitalopram (LEXAPRO) tablet 10 mg (10 mg Oral Given 3/14/25 0849)   rOPINIRole (REQUIP) tablet 0.25 mg (0.25 mg Oral Given 3/13/25 2052)   sodium chloride 0.9 % flush 10 mL (10 mL Intravenous Given 3/14/25 0849)   sodium chloride 0.9 % flush 10 mL (has no administration in time range)   sodium chloride 0.9 % infusion 40 mL (has no administration in time range)   nitroglycerin (NITROSTAT) SL tablet 0.4 mg (has no administration in time range)   Potassium Replacement - Follow Nurse / BPA Driven Protocol (has no administration in time range)   Magnesium Low Dose Replacement - Follow Nurse / BPA Driven Protocol (has no administration in time range)   Phosphorus Replacement - Follow Nurse / BPA Driven Protocol (has no administration in time range)   Calcium Replacement - Follow Nurse / BPA Driven Protocol (has no administration in time range)   acetaminophen (TYLENOL) tablet 650 mg (has no administration in time range)  "  sennosides-docusate (PERICOLACE) 8.6-50 MG per tablet 2 tablet (has no administration in time range)     And   polyethylene glycol (MIRALAX) packet 17 g (has no administration in time range)     And   bisacodyl (DULCOLAX) EC tablet 5 mg (has no administration in time range)     And   bisacodyl (DULCOLAX) suppository 10 mg (has no administration in time range)   ondansetron ODT (ZOFRAN-ODT) disintegrating tablet 4 mg (has no administration in time range)     Or   ondansetron (ZOFRAN) injection 4 mg (has no administration in time range)   pantoprazole (PROTONIX) EC tablet 40 mg (40 mg Oral Given 3/14/25 0612)   ferric gluconate (FERRLECIT) 250 MG in sodium chloride 0.9% 250 mL IVPB (250 mg Intravenous New Bag 3/14/25 0849)   cefTRIAXone (ROCEPHIN) 1,000 mg in sodium chloride 0.9 % 100 mL MBP (0 mg Intravenous Stopped 3/12/25 2205)   ferric gluconate (FERRLECIT)125 MG in sodium chloride 0.9 % 100 mL IVPB (0 mg Intravenous Stopped 3/13/25 0803)     CT Head Without Contrast  Result Date: 3/12/2025  Impression: No acute intracranial findings. Electronically Signed: Roland Celis  3/12/2025 6:40 PM EDT  Workstation ID: [id number]                                                     Medical Decision Making  /65 (BP Location: Right arm, Patient Position: Lying) Comment: RN AWARE  Pulse 71   Temp 98.3 °F (36.8 °C) (Axillary)   Resp 15   Ht 162.6 cm (64\")   Wt 59.3 kg (130 lb 11.7 oz)   SpO2 95%   BMI 22.44 kg/m²      Chart review: No recent relevant information in encounters    Patient is 85-year-old female who presents emergency department via EMS from [facility] with complaints of abnormal labs.  See full HPI above.    Primary assessment and initial physical exam noted above.    Patient is placed into an ED bed and into a gown for assessment.  IV access is established for labs and medication administration if indicated.  Primary differentials for patient include anemia, electrolyte derangement, " UTI.    Comorbidities:  has a past medical history of Dementia, Pacemaker (02/21/2017), and Syncope.    Radiology interpretation: Imaging reviewed by radiologist, Dr. Lemons, and myself  CT Head Without Contrast  Result Date: 3/12/2025  Impression: No acute intracranial findings. Electronically Signed: Roland Celis  3/12/2025 6:40 PM EDT  Workstation ID: [id number]    EKG considered but not indicated for this patient's presentation and complaints.  Any electrolyte derangement would have prompted serial EKGs.    Lab interpretation: Labs remarkable for hemoglobin of 6.1 on CBC and UA consistent with UTI.  No electrolyte derangement noted on CMP.    Type and screen and transfusion ordered for patient upon review of low hemoglobin.      Repeat assessment completed for patient; unchanged from previous.  Patient sees Dr. Condon for primary care and will be admitted to the hospital with a consult to family medicine.  Provider from [facility] contacted and full report is given prior to patient leaving the ED.    Appropriate PPE worn during exam.    I discussed findings with patient who voicds understanding of discharge instructions, signs and symptoms requiring return to ED; discharged improved and in stable condition with follow up for re-evaluation.  This document is intended for medical expert use only. Reading of this document by patients and/or patient's family without participating medical staff guidance may result in misinterpretation and unintended morbidity.  Any interpretation of such data is the responsibility of the patient and/or family member responsible for the patient in concert with their primary or specialist providers, not to be left for sources of online searches such as [employer], [employer] or similar queries. Relying on these approaches to knowledge may result in misinterpretation, misguided goals of care and even death should patients or family members try recommendations outside of the realm of professional  medical care in a supervised inpatient environment.         Problems Addressed:  Anemia, unspecified type: complicated acute illness or injury  Urinary tract infection without hematuria, site unspecified: complicated acute illness or injury    Amount and/or Complexity of Data Reviewed  Labs: ordered.  Radiology: ordered.    Risk  Prescription drug management.  Decision regarding hospitalization.        Final diagnoses:   Anemia, unspecified type   Urinary tract infection without hematuria, site unspecified       ED Disposition  ED Disposition       ED Disposition   Decision to Admit    Condition   --    Comment   Level of Care: Telemetry [5]   Admitting Physician: JOHN MCFADDEN [[id number]]   Attending Physician: JOHN MCFADDEN [[id number]]                 [address] [address] [phone]             Medication List      No changes were made to your prescriptions during this visit.            Shay Gtz PA-C  03/14/25 0909

## 2025-03-13 NOTE — DISCHARGE PLACEMENT REQUEST
"Viri Hatfield (85 y.o. Female)       Date of Birth   1940    Social Security Number       Address   Emil Smart lives at [facility] [place] [address]    Home Phone   [phone]    MRN   [id number]       Hoahaoism   None    Marital Status                               Admission Date   3/12/2025    Admission Type   Emergency    Admitting Provider   Rosalva Condon MD    Attending Provider   Rosalva Condon MD    Department, Room/Bed   [facility], 2114/1       Discharge Date       Discharge Disposition       Discharge Destination                                 Attending Provider: Rosalva Condon MD    Allergies: No Known Allergies    Isolation: Contact   Infection: Candida Auris (rule out) (03/12/25)   Code Status: No CPR    Ht: 162.6 cm (64\")   Wt: 59.8 kg (131 lb 13.4 oz)    Admission Cmt: None   Principal Problem: Anemia [D64.9]                   Active Insurance as of 3/12/2025       Primary Coverage       Payor Plan Insurance Group Employer/Plan Group    MEDICARE RAILROAD MEDICARE        Payor Plan Address Payor Plan Phone Number Payor Plan Fax Number Effective Dates    [address] [phone]  1/1/2003 - None Entered    [facility] [id number]         Subscriber Name Subscriber Birth Date Member ID       VIRI HATFIELD 1940 [id number]               Secondary Coverage       Payor Plan Insurance Group Employer/Plan Group    [facility] COMMUN[place]AN [place] [facility] [facility]       Payor Plan Address Payor Plan Phone Number Payor Plan Fax Number Effective Dates    [address]   7/1/2024 - None Entered    [facility] [id number]         Subscriber Name Subscriber Birth Date Member ID       VIRI HATFIELD 1940 [id number]                     Emergency Contacts        (Rel.) Home Phone Work Phone Mobile Phone    JOSERIKI (Grandchild) -- -- [phone]    JERRY BIANCHI (Grandchild) -- -- " [phone]    Monico Hatfield (Son) -- -- [phone]

## 2025-03-13 NOTE — CASE MANAGEMENT/SOCIAL WORK
Discharge Planning Assessment   Michael     Patient Name: Viri Hatfield  MRN: [id number]  Today's Date: 3/13/2025    Admit Date: 3/12/2025    Plan: Return to [facility]. No precert or PASRR needed for return.   Discharge Needs Assessment       Row Name 03/13/25 1059       Living Environment    People in Home facility resident    Name(s) of People in Home From [facility]    Current Living Arrangements extended care facility    Potentially Unsafe Housing Conditions none    In the past 12 months has the electric, gas, oil, or water company threatened to shut off services in your home? No    Provides Primary Care For no one, unable/limited ability to care for self    Family Caregiver if Needed grandchild(maranda), adult    Family Caregiver Names Grandson, Armen    Quality of Family Relationships involved    Able to Return to Prior Arrangements yes       Resource/Environmental Concerns    Resource/Environmental Concerns none    Transportation Concerns none       Transportation Needs    In the past 12 months, has lack of transportation kept you from medical appointments or from getting medications? no    In the past 12 months, has lack of transportation kept you from meetings, work, or from getting things needed for daily living? No       Food Insecurity    Within the past 12 months, you worried that your food would run out before you got the money to buy more. Never true    Within the past 12 months, the food you bought just didn't last and you didn't have money to get more. Never true       Transition Planning    Patient/Family Anticipates Transition to long-term care facility    Patient/Family Anticipated Services at Transition none    Transportation Anticipated health plan transportation       Discharge Needs Assessment    Readmission Within the Last 30 Days no previous admission in last 30 days    Equipment Currently Used at Home wheelchair    Concerns to be Addressed denies needs/concerns at this time     Anticipated Changes Related to Illness none    Equipment Needed After Discharge none                   Discharge Plan       Row Name 03/13/25 1100       Plan    Plan Return to [facility]. No precert or PASRR needed for return.    Patient/Family in Agreement with Plan yes    Plan Comments CM spoke to patient's grandsonArmen, by phone. Grandson agreeable to patient returning to [facility] at d/c. Jamilah reports patient utilizes [facility] w/c van transport for appts, will need w/c van transport at d/c. BURCH letter reviewed with jamilah, verbal consent and copy left at bedside per jamilah request. Barrier to D/C: IV abx, monitoring hgb (7.1).                      Expected Discharge Date and Time       Expected Discharge Date Expected Discharge Time    Mar 15, 2025            Demographic Summary       Row Name 03/13/25 1059       General Information    Admission Type observation    Arrived From emergency department    Referral Source admission list    Reason for Consult discharge planning    Preferred Language English       Contact Information    Permission Granted to Share Info With                    Functional Status       Row Name 03/13/25 1059       Functional Status    Usual Activity Tolerance poor    Current Activity Tolerance poor       Functional Status, IADL    Medications completely dependent    Meal Preparation completely dependent    Housekeeping completely dependent    Laundry completely dependent    Shopping completely dependent       Mental Status Summary    Recent Changes in Mental Status/Cognitive Functioning unable to assess    Mental Status Comments spoke to nephewArmen, by phone                       Patient Forms       Row Name 03/13/25 1102       Patient Forms    Important Message from Medicare ([facility]) --  BURCH 3/13/25    Patient Observation Letter Delivered  3/13/25    Delivered to Support person  Armne askew    Method of delivery Telephone                  Phone  communication or documentation only - no physical contact with patient or family.     MARIAH EverettN, [place] [address]    Office: [phone]  Fax: [phone]

## 2025-03-13 NOTE — PROGRESS NOTES
LOS: 1 day   Patient Care Team:  Sharath Henderson MD as PCP - General  Richard Peña MD as Consulting Physician (Cardiology)    Subjective     Interval History: Stable overnight.  S/p 1 unit packed red cells    Patient Complaints: Non-verbal; Eating breakfast well    History taken from: patient    Review of Systems   Unable to perform ROS: Dementia           Objective     Vital Signs  Temp:  [97.6 °F (36.4 °C)-98.7 °F (37.1 °C)] 98.3 °F (36.8 °C)  Heart Rate:  [68-81] 76  Resp:  [16-22] 22  BP: (105-167)/(41-71) 105/61    Physical Exam:     General Appearance:    Alert, cooperative, in no acute distress, chronically ill appearing   Head:    Normocephalic, without obvious abnormality, atraumatic   Eyes:            Lids and lashes normal, conjunctivae and sclerae normal, no   icterus, no pallor, corneas clear, PERRLA   Ears:    Ears appear intact with no abnormalities noted   Throat:   No oral lesions, no thrush, oral mucosa moist   Neck:   No adenopathy, supple, trachea midline, no thyromegaly, no   carotid bruit, no JVD   Lungs:     Clear to auscultation,respirations regular, even and                  unlabored    Heart:    Regular rhythm and normal rate, normal S1 and S2, no            murmur, no gallop, no rub, no click   Chest Wall:    No abnormalities observed   Abdomen:     Normal bowel sounds, no masses, no organomegaly, soft        Non-tender non-distended, no guarding,   Extremities:   Moves all extremities well, no edema, no cyanosis, no             Redness   Pulses:   Pulses palpable and equal bilaterally   Skin:   No bleeding, bruising or rash   Lymph nodes:   No palpable adenopathy   Neurologic:   Cranial nerves 2 - 12 grossly intact, sensation intact, DTR       present and equal bilaterally        Results Review:    Lab Results (last 24 hours)       Procedure Component Value Units Date/Time    Folate [[id number]]  (Normal) Collected: 03/12/25 9260    Specimen: Blood Updated: 03/13/25 4230      Folate 12.90 ng/mL     Narrative:      Results may be falsely increased if patient taking Biotin.      Vitamin B12 [[id number]]  (Normal) Collected: 03/12/25 1743    Specimen: Blood Updated: 03/13/25 1139     Vitamin B-12 437 pg/mL     Narrative:      Results may be falsely increased if patient taking Biotin.      Urine Culture - Urine, Straight Cath [[id number]] Collected: 03/12/25 1734    Specimen: Urine from Straight Cath Updated: 03/13/25 1023     Urine Culture >100,000 CFU/mL Mixed Laurie Isolated    Narrative:      Specimen contains mixed organisms of questionable pathogenicity suggestive of contamination. If symptoms persist, suggest recollection.  Colonization of the urinary tract without infection is common. Treatment is discouraged unless the patient is symptomatic, pregnant, or undergoing an invasive urologic procedure.    Ferritin [[id number]]  (Abnormal) Collected: 03/13/25 0108    Specimen: Blood Updated: 03/13/25 0156     Ferritin 6.71 ng/mL     Narrative:      Results may be falsely decreased if patient taking Biotin.      Basic Metabolic Panel [[id number]]  (Abnormal) Collected: 03/13/25 0108    Specimen: Blood Updated: 03/13/25 0136     Glucose 112 mg/dL      BUN 19 mg/dL      Creatinine 0.85 mg/dL      Sodium 139 mmol/L      Potassium 4.2 mmol/L      Chloride 108 mmol/L      CO2 20.5 mmol/L      Calcium 8.3 mg/dL      BUN/Creatinine Ratio 22.4     Anion Gap 10.5 mmol/L      eGFR 66.4 mL/min/1.73     Narrative:      GFR Categories in Chronic Kidney Disease (CKD)      GFR Category          GFR (mL/min/1.73)    Interpretation  G1                     90 or greater         Normal or high (1)  G2                      60-89                Mild decrease (1)  G3a                   45-59                Mild to moderate decrease  G3b                   30-44                Moderate to severe decrease  G4                    15-29                Severe decrease  G5                    14 or less           Kidney  failure          (1)In the absence of evidence of kidney disease, neither GFR category G1 or G2 fulfill the criteria for CKD.    eGFR calculation 2021 CKD-EPI creatinine equation, which does not include race as a factor    CBC & Differential [[id number]]  (Abnormal) Collected: 03/13/25 0108    Specimen: Blood Updated: 03/13/25 0121    Narrative:      The following orders were created for panel order CBC & Differential.  Procedure                               Abnormality         Status                     ---------                               -----------         ------                     CBC Auto Differential[[id number]]        Abnormal            Final result                 Please view results for these tests on the individual orders.    CBC Auto Differential [[id number]]  (Abnormal) Collected: 03/13/25 0108    Specimen: Blood Updated: 03/13/25 0121     WBC 6.60 10*3/mm3      RBC 3.36 10*6/mm3      Hemoglobin 7.1 g/dL      Hematocrit 25.3 %      MCV 75.3 fL      MCH 21.1 pg      MCHC 28.1 g/dL      RDW 18.4 %      RDW-SD 50.1 fl      MPV 9.3 fL      Platelets 364 10*3/mm3      Neutrophil % 61.9 %      Lymphocyte % 23.2 %      Monocyte % 9.1 %      Eosinophil % 4.2 %      Basophil % 1.1 %      Immature Grans % 0.5 %      Neutrophils, Absolute 4.09 10*3/mm3      Lymphocytes, Absolute 1.53 10*3/mm3      Monocytes, Absolute 0.60 10*3/mm3      Eosinophils, Absolute 0.28 10*3/mm3      Basophils, Absolute 0.07 10*3/mm3      Immature Grans, Absolute 0.03 10*3/mm3      nRBC 0.0 /100 WBC     CANDIDA AURIS PCR - Swab, Axilla Right, Axilla Left and Groin [[id number]] Collected: 03/13/25 0108    Specimen: Swab from Axilla Right, Axilla Left and Groin Updated: 03/13/25 0112    Iron Profile [[id number]]  (Abnormal) Collected: 03/12/25 1743    Specimen: Blood Updated: 03/12/25 2681     Iron 12 mcg/dL      Iron Saturation (TSAT) 2 %      Transferrin 336 mg/dL      TIBC 501 mcg/dL              Imaging Results (Last 24 Hours)       **  No results found for the last 24 hours. **                 I reviewed the patient's new clinical results.    Medication Review:   Scheduled Meds:escitalopram, 10 mg, Oral, Daily  ferric gluconate, 250 mg, Intravenous, Daily  pantoprazole, 40 mg, Oral, Q AM  rOPINIRole, 0.25 mg, Oral, Nightly  sodium chloride, 10 mL, Intravenous, Q12H      Continuous Infusions:   PRN Meds:.  acetaminophen    senna-docusate sodium **AND** polyethylene glycol **AND** bisacodyl **AND** bisacodyl    Calcium Replacement - Follow Nurse / BPA Driven Protocol    Magnesium Low Dose Replacement - Follow Nurse / BPA Driven Protocol    nitroglycerin    ondansetron ODT **OR** ondansetron    Phosphorus Replacement - Follow Nurse / BPA Driven Protocol    Potassium Replacement - Follow Nurse / BPA Driven Protocol    [COMPLETED] Insert Peripheral IV **AND** sodium chloride    sodium chloride    sodium chloride     Assessment & Plan       Anemia    Dementia    Acute UTI (urinary tract infection)    Fall    Iron deficiency anemia - hg improved after transfusion.  I discussed situation with grandson, who is listed as emergency contact.  He works in the medical field and is confident that conservative management is most appropriate.  With no visible signs of GI blood loss and the fact that patient is stable and asymptomatic, we will focus on iron replacement and ongoing monitoring.  Will start oral iron and PPI, along with iv iron.  If hg is improved tomorrow, anticipate transfer back to SNF.  She is DNR/DNI.    Urine culture shows mixed vida.  Pt is asymptomatic.  Will stop antibiotics.    Plan for disposition:LT facility tomorrow    Rosalva Condon MD  03/13/25  19:47 EDT

## 2025-03-13 NOTE — SIGNIFICANT NOTE
03/13/25 1416   PASRR   PASRR Status Return  (Return to [place], [facility])

## 2025-03-13 NOTE — PLAN OF CARE
Goal Outcome Evaluation:      Pleasant and cooperative with care. A & O x 0. Still non-verbal with staff. Holding baby all day. Ate all of breakfast but refused lunch; several attempts made to feed her. Iron infusions ordered. Rocephin and fluids discontinued. VSS. Continuing to monitor.

## 2025-03-13 NOTE — ED PROVIDER NOTES
Subjective   History of Present Illness  As per previous provider.  Note was created in error.        Review of Systems    Past Medical History:   Diagnosis Date    Dementia     Pacemaker 02/21/2017    AV Eduard Gleason - Dr. Zuñiga     Syncope     x3 Jan. and Feb. 2017       No Known Allergies    Past Surgical History:   Procedure Laterality Date    BREAST LUMPECTOMY Left     HERNIA REPAIR      HIP TROCHANTERIC NAILING WITH INTRAMEDULLARY HIP SCREW Left 8/12/2024    Procedure: HIP TROCHANTERIC NAILING SHORT WITH INTRAMEDULLARY HIP SCREW;  Surgeon: Tylor Sampson MD;  Location: [facility] MAIN OR;  Service: Orthopedics;  Laterality: Left;       Family History   Problem Relation Age of Onset    Cancer Mother        Social History     Socioeconomic History    Marital status:    Tobacco Use    Smoking status: Never   Vaping Use    Vaping status: Never Used   Substance and Sexual Activity    Alcohol use: Never    Drug use: Never    Sexual activity: Defer           Objective   Physical Exam    Procedures           ED Course  ED Course as of 03/12/25 2113   Wed Mar 12, 2025   1754 Labs sent; waiting for results [RS]   1757 Hgb 6.1; type and screen ordered now [RS]   1926 PRBCs ordered now that type and screen is resulted [RS]   2106 Received report from ALESSANDRA Eldridge. [LT]   2108 Consulted [facility]. [LT]   2112 Spoke with Sabrina, GERARDO with [facility] who agreed to admit patient and assume care. [LT]      ED Course User Index  [LT] Ritu Johnson APRN  [RS] Shay Gtz PA-C                                                       Medical Decision Making  Problems Addressed:  Abnormal labor: complicated acute illness or injury  Anemia, unspecified type: complicated acute illness or injury  Urinary tract infection without hematuria, site unspecified: complicated acute illness or injury    Amount and/or Complexity of Data Reviewed  Labs: ordered.  Radiology: ordered.    Risk  Prescription drug management.  Decision  regarding hospitalization.        Final diagnoses:   None       ED Disposition  ED Disposition       ED Disposition   Intended Admit    Condition   --    Comment   --               No follow-up provider specified.       Medication List      No changes were made to your prescriptions during this visit.          Suturegard Intro: Intraoperative tissue expansion was performed, utilizing the SUTUREGARD device, in order to reduce wound tension.

## 2025-03-13 NOTE — PLAN OF CARE
Goal Outcome Evaluation:  Plan of Care Reviewed With: patient, grandchild(maranda)        Progress: improving     Pt admitted to PCU from ED with 1 u PRBC infusing. Hgb recheck at 7.1, provider notified and new orders for iron infusion. Pt anxious on arrival to unit but calmed down and was able to sleep. Family at bedside, left when pt resting comfortably. Pt A&O x0 overnight. This RN spoke with staff from [facility], who informed this RN that pt requires assistance with feeding and takes pills crushed in applesauce. They also stated the pt's baseline is A&O x1 and baseline mobility is heavy 2 assist stand and pivot to wheelchair. Pt c/o some abd tenderness with palpation but otherwise denies pain. IVF started, pt's BP slightly elevated at times.

## 2025-03-14 VITALS
RESPIRATION RATE: 23 BRPM | OXYGEN SATURATION: 95 % | HEIGHT: 64 IN | SYSTOLIC BLOOD PRESSURE: 129 MMHG | HEART RATE: 86 BPM | BODY MASS INDEX: 22.32 KG/M2 | TEMPERATURE: 98.6 F | WEIGHT: 130.73 LBS | DIASTOLIC BLOOD PRESSURE: 55 MMHG

## 2025-03-14 LAB
ANION GAP SERPL CALCULATED.3IONS-SCNC: 9.8 MMOL/L (ref 5–15)
BASOPHILS # BLD AUTO: 0.07 10*3/MM3 (ref 0–0.2)
BASOPHILS NFR BLD AUTO: 0.9 % (ref 0–1.5)
BUN SERPL-MCNC: 16 MG/DL (ref 8–23)
BUN/CREAT SERPL: 14.5 (ref 7–25)
C AURIS DNA SPEC QL NAA+NON-PROBE: NOT DETECTED
CALCIUM SPEC-SCNC: 8.6 MG/DL (ref 8.6–10.5)
CHLORIDE SERPL-SCNC: 111 MMOL/L (ref 98–107)
CO2 SERPL-SCNC: 22.2 MMOL/L (ref 22–29)
CREAT SERPL-MCNC: 1.1 MG/DL (ref 0.57–1)
DEPRECATED RDW RBC AUTO: 49.7 FL (ref 37–54)
EGFRCR SERPLBLD CKD-EPI 2021: 48.7 ML/MIN/1.73
EOSINOPHIL # BLD AUTO: 0.32 10*3/MM3 (ref 0–0.4)
EOSINOPHIL NFR BLD AUTO: 4.2 % (ref 0.3–6.2)
ERYTHROCYTE [DISTWIDTH] IN BLOOD BY AUTOMATED COUNT: 18.4 % (ref 12.3–15.4)
GLUCOSE SERPL-MCNC: 105 MG/DL (ref 65–99)
HCT VFR BLD AUTO: 25.9 % (ref 34–46.6)
HEMOCCULT STL QL IA: NEGATIVE
HGB BLD-MCNC: 7.2 G/DL (ref 12–15.9)
IMM GRANULOCYTES # BLD AUTO: 0.04 10*3/MM3 (ref 0–0.05)
IMM GRANULOCYTES NFR BLD AUTO: 0.5 % (ref 0–0.5)
LYMPHOCYTES # BLD AUTO: 1.63 10*3/MM3 (ref 0.7–3.1)
LYMPHOCYTES NFR BLD AUTO: 21.5 % (ref 19.6–45.3)
MCH RBC QN AUTO: 20.9 PG (ref 26.6–33)
MCHC RBC AUTO-ENTMCNC: 27.8 G/DL (ref 31.5–35.7)
MCV RBC AUTO: 75.1 FL (ref 79–97)
MONOCYTES # BLD AUTO: 0.82 10*3/MM3 (ref 0.1–0.9)
MONOCYTES NFR BLD AUTO: 10.8 % (ref 5–12)
NEUTROPHILS NFR BLD AUTO: 4.71 10*3/MM3 (ref 1.7–7)
NEUTROPHILS NFR BLD AUTO: 62.1 % (ref 42.7–76)
NRBC BLD AUTO-RTO: 0 /100 WBC (ref 0–0.2)
PLATELET # BLD AUTO: 400 10*3/MM3 (ref 140–450)
PMV BLD AUTO: 9.6 FL (ref 6–12)
POTASSIUM SERPL-SCNC: 4.1 MMOL/L (ref 3.5–5.2)
RBC # BLD AUTO: 3.45 10*6/MM3 (ref 3.77–5.28)
SODIUM SERPL-SCNC: 143 MMOL/L (ref 136–145)
WBC NRBC COR # BLD AUTO: 7.59 10*3/MM3 (ref 3.4–10.8)

## 2025-03-14 PROCEDURE — 85025 COMPLETE CBC W/AUTO DIFF WBC: CPT

## 2025-03-14 PROCEDURE — 25810000003 SODIUM CHLORIDE 0.9 % SOLUTION: Performed by: INTERNAL MEDICINE

## 2025-03-14 PROCEDURE — 25010000002 NA FERRIC GLUC CPLX PER 12.5 MG: Performed by: INTERNAL MEDICINE

## 2025-03-14 PROCEDURE — 82274 ASSAY TEST FOR BLOOD FECAL: CPT

## 2025-03-14 PROCEDURE — 80048 BASIC METABOLIC PNL TOTAL CA: CPT

## 2025-03-14 PROCEDURE — G0378 HOSPITAL OBSERVATION PER HR: HCPCS

## 2025-03-14 RX ORDER — FERROUS SULFATE 325(65) MG
325 TABLET ORAL
Qty: 60 TABLET | Refills: 1 | Status: SHIPPED | OUTPATIENT
Start: 2025-03-14

## 2025-03-14 RX ORDER — PANTOPRAZOLE SODIUM 40 MG/1
40 TABLET, DELAYED RELEASE ORAL
Qty: 30 TABLET | Refills: 1 | Status: SHIPPED | OUTPATIENT
Start: 2025-03-15

## 2025-03-14 RX ADMIN — ESCITALOPRAM 10 MG: 10 TABLET, FILM COATED ORAL at 08:49

## 2025-03-14 RX ADMIN — PANTOPRAZOLE SODIUM 40 MG: 40 TABLET, DELAYED RELEASE ORAL at 06:12

## 2025-03-14 RX ADMIN — SODIUM CHLORIDE 250 MG: 9 INJECTION, SOLUTION INTRAVENOUS at 08:49

## 2025-03-14 RX ADMIN — Medication 10 ML: at 08:49

## 2025-03-14 NOTE — CASE MANAGEMENT/SOCIAL WORK
"Physicians Statement of Medical Necessity for  Ambulance Transportation    GENERAL INFORMATION     Name: Viri Hatfield  YOB: 1940  Medicare #: [id number]   Transport Date: 3/14/25 (Valid for round trips this date, or for scheduled repetitive trips for 60 days from the date signed below.)  [facility]   Destination: [address]   Is the Patient's stay covered under Medicare Part A (PPS/DRG?)Yes  Closest appropriate facility? Yes  If this a hosp-hosp transfer? No  Is this a hospice patient? No    MEDICAL NECESSITY QUESTIONAIRE    Ambulance Transportation is medically necessary only if other means of transportation are contraindicated or would be potentially harmful to the patient.  To meet this requirement, the patient must be either \"bed confined\" or suffer from a condition such that transport by means other than an ambulance is contraindicated by the patient's condition.  The following questions must be answered by the healthcare professional signing below for this form to be valid:     1) Describe the MEDICAL CONDITION (physical and/or mental) of this patient AT THE TIME OF AMBULANCE TRANSPORT that requires the patient to be transported in an ambulance, and why transport by other means is contraindicated by the patient's condition: confusion, max assist for bed mobility, unable to tolerate seated position for duration of transport.   Past Medical History:   Diagnosis Date    Dementia     Pacemaker 02/21/2017    AV Eduard Gleason - Dr. Zuñiga     Syncope     x3 Jan. and Feb. 2017      Past Surgical History:   Procedure Laterality Date    BREAST LUMPECTOMY Left     HERNIA REPAIR      HIP TROCHANTERIC NAILING WITH INTRAMEDULLARY HIP SCREW Left 8/12/2024    Procedure: HIP TROCHANTERIC NAILING SHORT WITH INTRAMEDULLARY HIP SCREW;  Surgeon: Tylor Sampson MD;  Location: [facility] MAIN OR;  Service: Orthopedics;  Laterality: Left;      2) Is " "this patient \"bed confined\" as defined below?Yes   To be \"bed confined\" the patient must satisfy all three of the following criteria:  (1) unable to get up from bed without assistance; AND (2) unable to ambulate;  AND (3) unable to sit in a chair or wheelchair.  3) Can this patient safely be transported by car or wheelchair van (I.e., may safely sit during transport, without an attendant or monitoring?)No   4. In addition to completing questions 1-3 above, please check any of the following conditions that apply*:          *Note: supporting documentation for any boxes checked must be maintained in the patient's medical records Patient is confused and Unable to tolerate seated position for time needed to transport      SIGNATURE OF PHYSICIAN OR OTHER AUTHORIZED HEALTHCARE PROFESSIONAL    I certify that the above information is true and correct based on my evaluation of this patient, and represent that the patient requires transport by ambulance and that other forms of transport are contraindicated.  I understand that this information will be used by the Centers for Medicare and Medicaid Services (CMS) to support the determiniation of medical necessity for ambulance services, and I represent that I have personal knowledge of the patient's condition at the time of transport.       If this box is checked, I also certify that the patient is physically or mentally incapable of signing the ambulance service's claim form and that the institution with which I am affiliated has furnished care, services or assistance to the patient.  My signature below is made on behalf of the patient pursuant to 42 .36(b)(4). In accordance with 42 .37, the specific reason(s) that the patient is physically or mentally incapable of signing the claim for is as follows:     Signature of Physician or Healthcare Professional  Date/Time:        (For Scheduled repetitive transport, this form is not valid for transports performed more " than 60 days after this date).                                                                                                                                            --------------------------------------------------------------------------------------------  Printed Name and Credentials of Physician or Authorized Healthcare Professional     *Form must be signed by patient's attending physician for scheduled, repetitive transports,.  For non-repetitive ambulance transports, if unable to obtain the signature of the attending physician, any of the following may sign (please select below):     Physician  Clinical Nurse Specialist  Registered Nurse     Physician Assistant  Discharge Planner  Licensed Practical Nurse     Nurse Practitioner

## 2025-03-14 NOTE — CASE MANAGEMENT/SOCIAL WORK
Continued Stay Note  [facility]     Patient Name: Viri Hatfield  MRN: [id number]  Today's Date: 3/14/2025    Admit Date: 3/12/2025    Plan: Return to [facility]. No precert or PASRR needed for return.   Discharge Plan       Row Name 03/14/25 1050       Plan    Plan Comments D/C orders noted.  CM confirmed with [place] liaison patient can return today. CM contacted jamilah/Armen POLANCO, by phone. Out of Hospital DNR reviewed, verbal consent obtained. CM arranged [facility], paperwork provided to RN.    Final Discharge Disposition Code 04 - intermediate care facility    Final Note [facility]                      Expected Discharge Date and Time       Expected Discharge Date Expected Discharge Time    Mar 14, 2025           Case Management Discharge Note      Final Note: [facility]         Selected Continued Care - Admitted Since 3/12/2025       Destination Coordination complete.      Service Provider Services Address Phone Fax Patient Preferred    [facility] [address] [place] [address] [phone] --                 Transportation Services  Ambulance: [facility]    Final Discharge Disposition Code: 04 - intermediate care facility    Phone communication or documentation only - no physical contact with patient or family.   JOSE Everett, RN    [id number] [place] [address]    Office: [phone]  Fax: [phone]

## 2025-03-14 NOTE — PLAN OF CARE
Goal Outcome Evaluation:  Plan of Care Reviewed With: patient        Progress: no change     Pt resting comfortably in bed overnight on RA. Pt oriented x0 but alert and responds to name. Attempted to place pt on 2L NC d/t apparent sleep apnea with desaturation but pt refused to keep NC on. BP slightly elevated d/t agitation with repositioning. VSS otherwise.

## 2025-03-14 NOTE — DISCHARGE SUMMARY
Date of Discharge:  3/14/2025    Discharge Diagnosis:   Iron deficiency Anemia  Dementia  Fall    Presenting Problem/History of Present Illness  Active Hospital Problems    Diagnosis  POA    **Anemia [D64.9]  Yes    Fall [W19.XXXA]  Yes    Acute UTI (urinary tract infection) [N39.0]  Yes    Dementia [F03.90]  Yes      Resolved Hospital Problems   No resolved problems to display.          Hospital Course    Patient is a 85 y.o. female presented with from [facility] and sent to the ED after labs were drawn and hemoglobin of 6. Patient has advanced Alzheimer's and is typically wheelchair bound. Anemia improved after transfusion. The grandson , who is listed as emergency contact. He works in the medical field and is confident that conservative management is most appropriate. With no visible signs of GI blood loss and the fact that patient is stable and asymptomatic.  Will start oral iron and PPI, along with iv iron. Urine culture showed mixed vida and antibiotics were stopped.     Procedures Performed         Consults:   Consults       Date and Time Order Name Status Description    3/12/2025  9:09 PM Family Medicine Consult              Pertinent Test Results:    Lab Results (most recent)       Procedure Component Value Units Date/Time    Basic Metabolic Panel [[id number]]  (Abnormal) Collected: 03/14/25 0225    Specimen: Blood from Arm, Left Updated: 03/14/25 0346     Glucose 105 mg/dL      BUN 16 mg/dL      Creatinine 1.10 mg/dL      Sodium 143 mmol/L      Potassium 4.1 mmol/L      Chloride 111 mmol/L      CO2 22.2 mmol/L      Calcium 8.6 mg/dL      BUN/Creatinine Ratio 14.5     Anion Gap 9.8 mmol/L      eGFR 48.7 mL/min/1.73     Narrative:      GFR Categories in Chronic Kidney Disease (CKD)      GFR Category          GFR (mL/min/1.73)    Interpretation  G1                     90 or greater         Normal or high (1)  G2                      60-89                Mild decrease (1)  G3a                   45-59                 Mild to moderate decrease  G3b                   30-44                Moderate to severe decrease  G4                    15-29                Severe decrease  G5                    14 or less           Kidney failure          (1)In the absence of evidence of kidney disease, neither GFR category G1 or G2 fulfill the criteria for CKD.    eGFR calculation 2021 CKD-EPI creatinine equation, which does not include race as a factor    Occult Blood, Fecal By Immunoassay - Stool, Per Rectum [[id number]]  (Normal) Collected: 03/14/25 0254    Specimen: Stool from Per Rectum Updated: 03/14/25 0309     Occult Blood, Fecal by Immunoassay Negative    CBC & Differential [[id number]]  (Abnormal) Collected: 03/14/25 0225    Specimen: Blood from Arm, Left Updated: 03/14/25 0239    Narrative:      The following orders were created for panel order CBC & Differential.  Procedure                               Abnormality         Status                     ---------                               -----------         ------                     CBC Auto Differential[[id number]]        Abnormal            Final result                 Please view results for these tests on the individual orders.    CBC Auto Differential [[id number]]  (Abnormal) Collected: 03/14/25 0225    Specimen: Blood from Arm, Left Updated: 03/14/25 0239     WBC 7.59 10*3/mm3      RBC 3.45 10*6/mm3      Hemoglobin 7.2 g/dL      Hematocrit 25.9 %      MCV 75.1 fL      MCH 20.9 pg      MCHC 27.8 g/dL      RDW 18.4 %      RDW-SD 49.7 fl      MPV 9.6 fL      Platelets 400 10*3/mm3      Neutrophil % 62.1 %      Lymphocyte % 21.5 %      Monocyte % 10.8 %      Eosinophil % 4.2 %      Basophil % 0.9 %      Immature Grans % 0.5 %      Neutrophils, Absolute 4.71 10*3/mm3      Lymphocytes, Absolute 1.63 10*3/mm3      Monocytes, Absolute 0.82 10*3/mm3      Eosinophils, Absolute 0.32 10*3/mm3      Basophils, Absolute 0.07 10*3/mm3      Immature Grans, Absolute 0.04 10*3/mm3      nRBC 0.0  /100 WBC     Folate [[id number]]  (Normal) Collected: 03/12/25 1743    Specimen: Blood Updated: 03/13/25 1139     Folate 12.90 ng/mL     Narrative:      Results may be falsely increased if patient taking Biotin.      Vitamin B12 [[id number]]  (Normal) Collected: 03/12/25 1743    Specimen: Blood Updated: 03/13/25 1139     Vitamin B-12 437 pg/mL     Narrative:      Results may be falsely increased if patient taking Biotin.      Urine Culture - Urine, Straight Cath [[id number]] Collected: 03/12/25 1734    Specimen: Urine from Straight Cath Updated: 03/13/25 1023     Urine Culture >100,000 CFU/mL Mixed Laurie Isolated    Narrative:      Specimen contains mixed organisms of questionable pathogenicity suggestive of contamination. If symptoms persist, suggest recollection.  Colonization of the urinary tract without infection is common. Treatment is discouraged unless the patient is symptomatic, pregnant, or undergoing an invasive urologic procedure.    Ferritin [[id number]]  (Abnormal) Collected: 03/13/25 0108    Specimen: Blood Updated: 03/13/25 0156     Ferritin 6.71 ng/mL     Narrative:      Results may be falsely decreased if patient taking Biotin.      Basic Metabolic Panel [[id number]]  (Abnormal) Collected: 03/13/25 0108    Specimen: Blood Updated: 03/13/25 0136     Glucose 112 mg/dL      BUN 19 mg/dL      Creatinine 0.85 mg/dL      Sodium 139 mmol/L      Potassium 4.2 mmol/L      Chloride 108 mmol/L      CO2 20.5 mmol/L      Calcium 8.3 mg/dL      BUN/Creatinine Ratio 22.4     Anion Gap 10.5 mmol/L      eGFR 66.4 mL/min/1.73     Narrative:      GFR Categories in Chronic Kidney Disease (CKD)      GFR Category          GFR (mL/min/1.73)    Interpretation  G1                     90 or greater         Normal or high (1)  G2                      60-89                Mild decrease (1)  G3a                   45-59                Mild to moderate decrease  G3b                   30-44                Moderate to severe  decrease  G4                    15-29                Severe decrease  G5                    14 or less           Kidney failure          (1)In the absence of evidence of kidney disease, neither GFR category G1 or G2 fulfill the criteria for CKD.    eGFR calculation 2021 CKD-EPI creatinine equation, which does not include race as a factor    CBC & Differential [[id number]]  (Abnormal) Collected: 03/13/25 0108    Specimen: Blood Updated: 03/13/25 0121    Narrative:      The following orders were created for panel order CBC & Differential.  Procedure                               Abnormality         Status                     ---------                               -----------         ------                     CBC Auto Differential[[id number]]        Abnormal            Final result                 Please view results for these tests on the individual orders.    CBC Auto Differential [[id number]]  (Abnormal) Collected: 03/13/25 0108    Specimen: Blood Updated: 03/13/25 0121     WBC 6.60 10*3/mm3      RBC 3.36 10*6/mm3      Hemoglobin 7.1 g/dL      Hematocrit 25.3 %      MCV 75.3 fL      MCH 21.1 pg      MCHC 28.1 g/dL      RDW 18.4 %      RDW-SD 50.1 fl      MPV 9.3 fL      Platelets 364 10*3/mm3      Neutrophil % 61.9 %      Lymphocyte % 23.2 %      Monocyte % 9.1 %      Eosinophil % 4.2 %      Basophil % 1.1 %      Immature Grans % 0.5 %      Neutrophils, Absolute 4.09 10*3/mm3      Lymphocytes, Absolute 1.53 10*3/mm3      Monocytes, Absolute 0.60 10*3/mm3      Eosinophils, Absolute 0.28 10*3/mm3      Basophils, Absolute 0.07 10*3/mm3      Immature Grans, Absolute 0.03 10*3/mm3      nRBC 0.0 /100 WBC     CANDIDA A[facility]IS PCR - Swab, Axilla Right, Axilla Left and Groin [[id number]] Collected: 03/13/25 0108    Specimen: Swab from Axilla Right, Axilla Left and Groin Updated: 03/13/25 0112    Iron Profile [[id number]]  (Abnormal) Collected: 03/12/25 7713    Specimen: Blood Updated: 03/12/25 2252     Iron 12 mcg/dL      Iron  Saturation (TSAT) 2 %      Transferrin 336 mg/dL      TIBC 501 mcg/dL     Scan Slide [[id number]] Collected: 03/12/25 1743    Specimen: Blood Updated: 03/12/25 1851     Anisocytosis Slight/1+     Hypochromia Slight/1+     Microcytes Slight/1+     WBC Morphology Normal     Platelet Estimate Adequate    Comprehensive Metabolic Panel [[id number]]  (Abnormal) Collected: 03/12/25 1743    Specimen: Blood Updated: 03/12/25 1806     Glucose 100 mg/dL      BUN 23 mg/dL      Creatinine 0.90 mg/dL      Sodium 141 mmol/L      Potassium 4.7 mmol/L      Chloride 108 mmol/L      CO2 21.5 mmol/L      Calcium 8.6 mg/dL      Total Protein 5.7 g/dL      Albumin 4.0 g/dL      ALT (SGPT) 14 U/L      AST (SGOT) 15 U/L      Alkaline Phosphatase 107 U/L      Total Bilirubin 0.2 mg/dL      Globulin 1.7 gm/dL      A/G Ratio 2.4 g/dL      BUN/Creatinine Ratio 25.6     Anion Gap 11.5 mmol/L      eGFR 62.0 mL/min/1.73     Narrative:      GFR Categories in Chronic Kidney Disease (CKD)      GFR Category          GFR (mL/min/1.73)    Interpretation  G1                     90 or greater         Normal or high (1)  G2                      60-89                Mild decrease (1)  G3a                   45-59                Mild to moderate decrease  G3b                   30-44                Moderate to severe decrease  G4                    15-29                Severe decrease  G5                    14 or less           Kidney failure          (1)In the absence of evidence of kidney disease, neither GFR category G1 or G2 fulfill the criteria for CKD.    eGFR calculation 2021 CKD-EPI creatinine equation, which does not include race as a factor    Urinalysis, Microscopic Only - Straight Cath [[id number]]  (Abnormal) Collected: 03/12/25 1734    Specimen: Urine from Straight Cath Updated: 03/12/25 1803     RBC, UA 0-2 /HPF      WBC, UA 11-20 /HPF      Bacteria, UA 4+ /HPF      Squamous Epithelial Cells, UA 7-12 /HPF      Hyaline Casts, UA None Seen /LPF       Methodology Manual Light Microscopy    Urinalysis With Culture If Indicated - Straight Cath [[id number]]  (Abnormal) Collected: 03/12/25 1734    Specimen: Urine from Straight Cath Updated: 03/12/25 1750     Color, UA Yellow     Appearance, UA Slightly Cloudy     pH, UA <=5.0     Specific Gravity, UA 1.020     Glucose, UA Negative     Ketones, UA Negative     Bilirubin, UA Negative     Blood, UA Negative     Protein, UA Negative     Leuk Esterase, UA Small (1+)     Nitrite, UA Positive     Urobilinogen, UA 1.0 E.U./dL    Narrative:      In absence of clinical symptoms, the presence of pyuria, bacteria, and/or nitrites on the urinalysis result does not correlate with infection.    Extra Tubes [[id number]] Collected: 03/12/25 1743    Specimen: Blood Updated: 03/12/25 1745    Narrative:      The following orders were created for panel order Extra Tubes.  Procedure                               Abnormality         Status                     ---------                               -----------         ------                     Gold Top - SST[[id number]]                                   Final result               Light Blue Top[[id number]]                                   Final result                 Please view results for these tests on the individual orders.    Gold Top - SST [[id number]] Collected: 03/12/25 1743    Specimen: Blood Updated: 03/12/25 1745     Extra Tube Hold for add-ons.     Comment: Auto resulted.       Light Blue Top [[id number]] Collected: 03/12/25 1743    Specimen: Blood Updated: 03/12/25 1745     Extra Tube Hold for add-ons.     Comment: Auto resulted                       Condition on Discharge:  Stable    Vital Signs  Temp:  [98.3 °F (36.8 °C)-98.8 °F (37.1 °C)] 98.3 °F (36.8 °C)  Heart Rate:  [71-82] 71  Resp:  [13-24] 13  BP: ()/(43-74) 92/74      Physical Exam  Vitals reviewed.   Constitutional:       Appearance: She is not ill-appearing.   HENT:      Head: Normocephalic and atraumatic.       Right Ear: External ear normal.      Left Ear: External ear normal.      Nose: Nose normal.      Mouth/Throat:      Mouth: Mucous membranes are moist.   Eyes:      General:         Right eye: No discharge.         Left eye: No discharge.   Cardiovascular:      Rate and Rhythm: Normal rate and regular rhythm.      Pulses: Normal pulses.      Heart sounds: Normal heart sounds.   Pulmonary:      Effort: Pulmonary effort is normal.      Breath sounds: Normal breath sounds.   Abdominal:      General: Bowel sounds are normal.      Palpations: Abdomen is soft.   Musculoskeletal:         General: Normal range of motion.      Cervical back: Normal range of motion.   Skin:     Coloration: Skin is pale.   Neurological:      Mental Status: She is alert. Mental status is at baseline.   Psychiatric:         Behavior: Behavior normal.              Discharge Disposition      Discharge Medications     Discharge Medications        New Medications        Instructions Start Date   ferrous sulfate 325 (65 FE) MG tablet   325 mg, Oral, Daily With Breakfast      pantoprazole 40 MG EC tablet  Commonly known as: PROTONIX   40 mg, Oral, Every Early Morning   Start Date: March 15, 2025            Continue These Medications        Instructions Start Date   acetaminophen 325 MG tablet  Commonly known as: TYLENOL   650 mg, Every 6 Hours PRN      bisacodyl 10 MG suppository  Commonly known as: DULCOLAX   10 mg, See Admin Instructions      hyoscyamine 0.125 MG tablet  Commonly known as: ANASPAZ,LEVSIN   0.125 mg, Every 4 Hours PRN      MiraLax 17 GM/SCOOP powder  Generic drug: polyethylene glycol   17 g, Daily PRN      rOPINIRole 0.25 MG tablet  Commonly known as: REQUIP   0.25 mg, Nightly      senna 8.6 MG tablet  Commonly known as: SENOKOT   1 tablet, Every 12 Hours PRN             Stop These Medications      Aspirin Low Dose 81 MG chewable tablet  Generic drug: aspirin     escitalopram 10 MG tablet  Commonly known as: LEXAPRO               Discharge Diet:   Diet Instructions       Diet: Regular/House Diet; Regular (IDDSI 7); Thin (IDDSI 0)      Discharge Diet: Regular/House Diet    Texture: Regular (IDDSI 7)    Fluid Consistency: Thin (IDDSI 0)            Activity at Discharge:   Activity Instructions       Activity as Tolerated              Follow-up Appointments  Future Appointments   Date Time Provider Department Center   8/6/2025 11:30 AM [facility]   8/6/2025 11:45 AM Richard Peña MD [facility]         Test Results Pending at Discharge  Pending Results       Procedure [Order ID] Specimen - Date/Time    [facility] A[facility] PCR - Swab, Axilla Right, Axilla Left and Groin [[id number]] Collected: 03/13/25 0108    Specimen: Swab from Axilla Right, Axilla Left and Groin Updated: 03/13/25 0112    Occult Blood, Fecal By Immunoassay - Stool, Per Rectum [[id number]]     Specimen: Stool from Per Rectum     Occult Blood, Fecal By Immunoassay - Stool, Per Rectum [[id number]]     Specimen: Stool from Per Rectum              VANESSA Vazquez  03/14/25  10:34 EDT    Time: Discharge 25 min

## (undated) DEVICE — GLV SURG BIOGEL LTX PF 8

## (undated) DEVICE — DRESSING,GAUZE,XEROFORM,CURAD,1"X8",ST: Brand: CURAD

## (undated) DEVICE — GW TEAR DROP NAT 3X100CM

## (undated) DEVICE — REAMER SHAFT, MOD.TRINKLE: Brand: BIXCUT

## (undated) DEVICE — MAT FLR ABSORBENT LG 4FT 10 2.5FT

## (undated) DEVICE — C-ARM: Brand: UNBRANDED

## (undated) DEVICE — PROXIMATE RH ROTATING HEAD SKIN STAPLERS (35 WIDE) CONTAINS 35 STAINLESS STEEL STAPLES: Brand: PROXIMATE

## (undated) DEVICE — APPL CHLORAPREP HI/LITE 26ML ORNG

## (undated) DEVICE — OPTIFOAM GENTLE SA, BORDERED, 6X6: Brand: MEDLINE

## (undated) DEVICE — BNDG ELAS CO-FLEX SLF ADHR 4IN5YD LF STRL

## (undated) DEVICE — 6617 IOBAN II PATIENT ISOLATION DRAPE 5/BX,4BX/CS: Brand: STERI-DRAPE™ IOBAN™ 2

## (undated) DEVICE — UNDRGLV SURG BIOGEL PIMICROINDICATOR SYNTH SZ8 LF STRL

## (undated) DEVICE — DRSNG GZ CURAD XEROFORM NONADHR OVERWRAP 5X9IN

## (undated) DEVICE — GLV SURG SIGNATURE ESSENTIAL PF LTX SZ8.5

## (undated) DEVICE — ANTIBACTERIAL UNDYED BRAIDED (POLYGLACTIN 910), SYNTHETIC ABSORBABLE SUTURE: Brand: COATED VICRYL

## (undated) DEVICE — PK GAM NAIL 50

## (undated) DEVICE — LEGGINGS, PAIR, 33X51 XL, STERILE: Brand: MEDLINE

## (undated) DEVICE — PENCL SMOKE/EVAC MEGADYNE TELESCP 10FT

## (undated) DEVICE — GUIDEPIN TEMP THRD 3.2X508MM DISP

## (undated) DEVICE — Device

## (undated) DEVICE — PAD,ABDOMINAL,8"X10",ST,LF: Brand: MEDLINE

## (undated) DEVICE — DRP C/ARMOR

## (undated) DEVICE — DRSNG WND SIL OPTIFOAM GENTLE BRDR ADHS W/SA 4X4IN

## (undated) DEVICE — GLV SURG SENSICARE PI ORTHO SZ8 LF STRL